# Patient Record
Sex: MALE | Race: WHITE | NOT HISPANIC OR LATINO | Employment: OTHER | URBAN - NONMETROPOLITAN AREA
[De-identification: names, ages, dates, MRNs, and addresses within clinical notes are randomized per-mention and may not be internally consistent; named-entity substitution may affect disease eponyms.]

---

## 2017-02-27 ENCOUNTER — HOSPITAL ENCOUNTER (EMERGENCY)
Facility: HOSPITAL | Age: 47
Discharge: HOME | End: 2017-02-27

## 2017-02-27 VITALS
HEIGHT: 66 IN | TEMPERATURE: 98.3 F | BODY MASS INDEX: 26.52 KG/M2 | WEIGHT: 165 LBS | SYSTOLIC BLOOD PRESSURE: 126 MMHG | HEART RATE: 83 BPM | RESPIRATION RATE: 17 BRPM | DIASTOLIC BLOOD PRESSURE: 93 MMHG | OXYGEN SATURATION: 99 %

## 2017-02-27 DIAGNOSIS — L23.7 ALLERGIC CONTACT DERMATITIS DUE TO PLANT: Primary | ICD-10-CM

## 2017-02-27 PROCEDURE — 96372 THER/PROPH/DIAG INJ SC/IM: CPT

## 2017-02-27 PROCEDURE — 99283 EMERGENCY DEPT VISIT LOW MDM: CPT

## 2017-02-27 RX ORDER — PREDNISONE 10 MG/1
TABLET ORAL
Qty: 45 TABLET | Refills: 0 | OUTPATIENT
Start: 2017-02-27 | End: 2022-02-26

## 2017-02-27 RX ORDER — TETRACAINE HYDROCHLORIDE 5 MG/ML
1 SOLUTION OPHTHALMIC ONCE
Status: COMPLETED | OUTPATIENT
Start: 2017-02-27 | End: 2017-02-27

## 2017-02-27 RX ORDER — HYDROXYZINE HYDROCHLORIDE 25 MG/1
25 TABLET, FILM COATED ORAL 3 TIMES DAILY PRN
Qty: 15 TABLET | Refills: 0 | Status: SHIPPED | OUTPATIENT
Start: 2017-02-27 | End: 2022-07-12 | Stop reason: ALTCHOICE

## 2017-02-27 RX ORDER — METHYLPREDNISOLONE ACETATE 80 MG/ML
80 INJECTION, SUSPENSION INTRA-ARTICULAR; INTRALESIONAL; INTRAMUSCULAR; SOFT TISSUE ONCE
Status: COMPLETED | OUTPATIENT
Start: 2017-02-27 | End: 2017-02-27

## 2017-02-27 RX ADMIN — METHYLPREDNISOLONE ACETATE 80 MG: 80 INJECTION, SUSPENSION INTRA-ARTICULAR; INTRALESIONAL; INTRAMUSCULAR; SOFT TISSUE at 11:20

## 2017-02-27 RX ADMIN — TETRACAINE HYDROCHLORIDE 1 DROP: 5 SOLUTION OPHTHALMIC at 11:09

## 2017-02-27 RX ADMIN — FLUORESCEIN SODIUM 1 STRIP: 1 STRIP OPHTHALMIC at 11:09

## 2018-03-29 ENCOUNTER — HOSPITAL ENCOUNTER (EMERGENCY)
Facility: HOSPITAL | Age: 48
Discharge: HOME | End: 2018-03-29
Attending: EMERGENCY MEDICINE

## 2018-03-29 VITALS
DIASTOLIC BLOOD PRESSURE: 82 MMHG | HEIGHT: 66 IN | SYSTOLIC BLOOD PRESSURE: 135 MMHG | TEMPERATURE: 98.2 F | BODY MASS INDEX: 25.71 KG/M2 | HEART RATE: 110 BPM | OXYGEN SATURATION: 97 % | RESPIRATION RATE: 18 BRPM | WEIGHT: 160 LBS

## 2018-03-29 DIAGNOSIS — L23.7 POISON IVY: Primary | ICD-10-CM

## 2018-03-29 PROCEDURE — 99283 EMERGENCY DEPT VISIT LOW MDM: CPT

## 2018-03-29 RX ORDER — PREDNISONE 20 MG/1
60 TABLET ORAL ONCE
Status: COMPLETED | OUTPATIENT
Start: 2018-03-29 | End: 2018-03-29

## 2018-03-29 RX ORDER — PREDNISONE 10 MG/1
TABLET ORAL
Qty: 40 TABLET | Refills: 0 | OUTPATIENT
Start: 2018-03-29 | End: 2022-02-26

## 2018-03-29 RX ADMIN — PREDNISONE 60 MG: 20 TABLET ORAL at 22:23

## 2018-03-30 NOTE — DISCHARGE INSTRUCTIONS
Poison Ivy   WHAT YOU NEED TO KNOW:   Poison ivy is a plant that can cause an itchy, uncomfortable rash on your skin  Poison ivy grows as a shrub or vine in woods, fields, and areas of thick [place]  It has 3 bright green leaves on each stem that turn red in rachna  DISCHARGE INSTRUCTIONS:   Medicines:   · Antiseptic or drying creams or ointments: These medicines may be used to dry out the rash and decrease the itching  These products may be available without a doctor's order  · Steroids: This medicine helps decrease itching and inflammation  It can be given as a cream to apply to your skin or as a pill  · Antihistamines: This medicine may help decrease itching and help you sleep  It is available without a doctor's order  · Take your medicine as directed  Contact your healthcare provider if you think your medicine is not helping or if you have side effects  Tell him of her if you are allergic to any medicine  Keep a list of the medicines, vitamins, and herbs you take  Include the amounts, and when and why you take them  Bring the list or the pill bottles to follow-up visits  Carry your medicine list with you in case of an emergency  Follow up with your healthcare provider as directed:  Write down your questions so you remember to ask them during your visits  How your poison ivy rash spreads: You cannot spread poison ivy by touching your rash or the liquid from your blisters  Poison ivy is spread only if you scratch your skin while it still has oil on it  You may think your rash is spreading because new rashes appear over a number of days  This happens because areas covered by thin skin break out in a rash first  Your face or forearms may develop a rash before thicker areas, such as the palms of your hands  Self-care:   · Keep your rash clean and dry:  Wash it with soap and water  Gently pat it dry with a clean towel  · Try not to scratch or rub your rash:   This can cause your skin to become infected  · Use a compress on your rash:  Dip a clean washcloth in cool water  Wring it out and place it on your rash  Leave the washcloth on your skin for 15 minutes  Do this at least 3 times per day  · Take a cornstarch or oatmeal bath: If your rash is too large to cover with wet washcloths, take 3 or 4 cornstarch baths daily  Mix 1 pound of cornstarch with a little water to make a paste  Add the paste to a tub full of water and mix well  You may also use colloidal oatmeal in the bath water  Use lukewarm water  Avoid hot water because it may cause your itching to increase  Prevent a poison ivy rash in the future:   · Wear skin protection:  Wear long pants, a long-sleeved shirt, and gloves  Use a skin block lotion to protect your skin from poison ivy oil  You can find this at a drugstore without a prescription  · Wash clothing after possible exposure: If you think you have been near a poison ivy plant, wash the clothes you were wearing separately from other clothes  Rinse the washing machine well after you take the clothes out  Scrub boots and shoes with warm, soapy water  Dry clean items and clothing that you cannot wash in water  Poison ivy oil is sticky and can stay on surfaces for a long time  It can cause a new rash even years later  · Bathe your pet:  Use warm water and shampoo on your pet's fur  This will prevent the spread of oil to your skin, car, and home  Wear long sleeves, long pants, and gloves while washing pets or any items that may have oil on them  · Reduce exposure to poison ivy:  Do not touch plants that look like poison ivy  Keep your yard free of poison ivy  While protecting your skin, remove the plant and the roots  Place them in a plastic bag and seal the bag tightly  · Do not burn poison ivy plants: This can spread the oil through the air  If you breathe the oil into your lungs, you could have swelling and serious breathing problems   Oil that clings to the fire attila can land on your skin and cause a rash  Contact your healthcare provider if:   · You have pus, soft yellow scabs, or tenderness on the rash  · The itching gets worse or keeps you awake at night  · The rash covers more than 1/4 of your skin or spreads to your eyes, mouth, or genital area  · The rash is not better after 2 to 3 weeks  · You have tender, swollen glands on the sides of your neck  · You have swelling in your arms and legs  · You have questions or concerns about your condition or care  Return to the emergency department if:   · You have a fever  · You have redness, swelling, and tenderness around the rash  · You have trouble breathing  © 2017 [facility] Information is for End User's use only and may not be sold, redistributed or otherwise used for commercial purposes  All illustrations and images included in CareNotes® are the copyrighted property of A D A M [employer]  or Micheal Pablo  The above information is an  only  It is not intended as medical advice for individual conditions or treatments  Talk to your doctor, nurse or pharmacist before following any medical regimen to see if it is safe and effective for you  Over-the-counter Benadryl for itching  Calamine topically for rash

## 2018-03-30 NOTE — ED PROVIDER NOTES
History  Chief Complaint   Patient presents with    Facial Swelling     c/o facial swelling related to "yard work and I think I got something poisonous on me"  Pt has same rash observed to R lower forearm  Reports itching, denies pain  Patient is a 39-year-old male  He is very sensitive to poison ivy  He was out doing yard work  He has since developed a pruritic rash to his face and arms that is similar to prior exacerbations of poison ivy  No trouble breathing  He is concerned because if he does not treated, usually swells up real bad  No fever or chills            Prior to Admission Medications   Prescriptions Last Dose Informant Patient Reported? Taking?   hydrOXYzine HCL (ATARAX) 25 mg tablet   No No   Sig: Take 1 tablet by mouth 3 (three) times a day as needed for itching for up to 5 days   mupirocin (BACTROBAN) 2 % ointment   No No   Sig: Apply 1 application topically 3 (three) times a day for 5 days   predniSONE 10 mg tablet   No No   Si tablets po daily x 3 days; 4 tablets po daily  3 days; 3 tablets po daily x3 days; 2 tablets po daily x3 days; 1 tablet po daily x3 days  Facility-Administered Medications: None       History reviewed  No pertinent past medical history  Past Surgical History:   Procedure Laterality Date    ANKLE FRACTURE SURGERY      TONSILLECTOMY         History reviewed  No pertinent family history  I have reviewed and agree with the history as documented  Social History   Substance Use Topics    Smoking status: Current Every Day Smoker     Packs/day: 0 25     Years: 20 00     Types: Cigarettes    Smokeless tobacco: Never Used    Alcohol use 1 2 oz/week     2 Cans of beer per week      Comment: "once a week"  Review of Systems   Constitutional: Negative for chills and fever  HENT: Negative for drooling, facial swelling, trouble swallowing and voice change  Respiratory: Negative for shortness of breath, wheezing and stridor      Skin: Positive for rash  All other systems reviewed and are negative  Physical Exam  ED Triage Vitals [03/29/18 2138]   Temperature Pulse Respirations Blood Pressure SpO2   98 2 °F (36 8 °C) (!) 110 18 135/82 97 %      Temp Source Heart Rate Source Patient Position - Orthostatic VS BP Location FiO2 (%)   Temporal Monitor Sitting Right arm --      Pain Score       No Pain           Orthostatic Vital Signs  Vitals:    03/29/18 2138   BP: 135/82   Pulse: (!) 110   Patient Position - Orthostatic VS: Sitting       Physical Exam   Constitutional: He is oriented to person, place, and time  He appears well-developed and well-nourished  HENT:   Head: Normocephalic and atraumatic  Eyes: Conjunctivae are normal  Right eye exhibits no discharge  Left eye exhibits no discharge  Neck: Normal range of motion  Neck supple  Pulmonary/Chest: Effort normal  No respiratory distress  Musculoskeletal: Normal range of motion  He exhibits no deformity  Neurological: He is alert and oriented to person, place, and time  Skin: Skin is warm and dry  Rash noted  There is a patchy crusty slightly raised rash the face  There are papules in linear lesions to the forearms and hands  Psychiatric: He has a normal mood and affect  His behavior is normal    Vitals reviewed        ED Medications  Medications   predniSONE tablet 60 mg (60 mg Oral Given 3/29/18 2223)       Diagnostic Studies  Results Reviewed     None                 No orders to display              Procedures  Procedures       Phone Contacts  ED Phone Contact    ED Course  ED Course                                MDM  CritCare Time    Disposition  Final diagnoses:   Poison ivy     Time reflects when diagnosis was documented in both MDM as applicable and the Disposition within this note     Time User Action Codes Description Comment    3/29/2018 10:23 PM Can Lino Add [L23 7] Poison ivy       ED Disposition     ED Disposition Condition Comment    Discharge  Angelina Tracy discharge to home/self care  Condition at discharge: Good        Follow-up Information     Follow up With Specialties Details Why Contact Info    Andrei Mckeon   As needed [address]  [place] [address]  [phone]          Patient's Medications   Discharge Prescriptions    PREDNISONE 10 MG TABLET    60mg po qd x 3 days, then 40mg po qd x 3 days, then 20mg po qd x 3 days, then 10mg po qd x 3 days, then stop       Start Date: 3/29/2018 End Date: --       Order Dose: --       Quantity: 40 tablet    Refills: 0     No discharge procedures on file      ED Provider  Electronically Signed by           Michael Josue MD  03/29/18 0750

## 2018-06-08 ENCOUNTER — HOSPITAL ENCOUNTER (EMERGENCY)
Facility: HOSPITAL | Age: 48
Discharge: HOME | End: 2018-06-08
Attending: EMERGENCY MEDICINE

## 2018-06-08 VITALS
HEART RATE: 112 BPM | RESPIRATION RATE: 18 BRPM | TEMPERATURE: 98.7 F | BODY MASS INDEX: 24.73 KG/M2 | OXYGEN SATURATION: 97 % | SYSTOLIC BLOOD PRESSURE: 119 MMHG | WEIGHT: 153.22 LBS | DIASTOLIC BLOOD PRESSURE: 74 MMHG

## 2018-06-08 DIAGNOSIS — L23.7 POISON IVY DERMATITIS: Primary | ICD-10-CM

## 2018-06-08 PROCEDURE — 99282 EMERGENCY DEPT VISIT SF MDM: CPT

## 2018-06-08 RX ORDER — PREDNISONE 10 MG/1
TABLET ORAL
Qty: 45 TABLET | Refills: 0 | OUTPATIENT
Start: 2018-06-08 | End: 2022-02-26

## 2018-06-08 RX ADMIN — PREDNISONE 50 MG: 10 TABLET ORAL at 01:32

## 2018-06-08 NOTE — ED PROVIDER NOTES
History  Chief Complaint   Patient presents with    Poison Ivy     Pt c/o poison ivy on left arm, left leg and right leg since last night - worse tonight     This is a pleasant 30-year-old male with the noted past medical history presents with a 1 day history of contact dermatitis from poison ivy of the left ventral forearm and left anterior leg  States he had exposure while weed whacking within the past 2 days and he has a significant hypersensitivity to poison ivy that has gotten worse over the past several years  He has been applying calamine lotion without improvement in symptoms  He had a similar severe eruption in 2018 for which he was seen in this emergency department and prescribed prednisone which he states resolved his sx  He has significant pruritus and scant amounts of crusting/discharge in the affected areas  Typical contact dermatitis with exposure history consistent with same  Patient states he has had good symptomatic relief with use of steroid taper previously and I will prescribe him same now  Advised PMD follow-up at completion of steroid taper for re-evaluation of symptoms  Return for any signs/symptoms of supervening infection  All questions answered prior to discharge  The patient expressed understanding and agreed to plan  History provided by:  Patient  Poison Elvira   Associated symptoms: no fatigue, no fever and no rash        Prior to Admission Medications   Prescriptions Last Dose Informant Patient Reported? Taking?   hydrOXYzine HCL (ATARAX) 25 mg tablet   No No   Sig: Take 1 tablet by mouth 3 (three) times a day as needed for itching for up to 5 days   mupirocin (BACTROBAN) 2 % ointment   No No   Sig: Apply 1 application topically 3 (three) times a day for 5 days   predniSONE 10 mg tablet   No No   Si tablets po daily x 3 days; 4 tablets po daily  3 days; 3 tablets po daily x3 days; 2 tablets po daily x3 days; 1 tablet po daily x3 days     predniSONE 10 mg tablet No No   Simg po qd x 3 days, then 40mg po qd x 3 days, then 20mg po qd x 3 days, then 10mg po qd x 3 days, then stop      Facility-Administered Medications: None       History reviewed  No pertinent past medical history  Past Surgical History:   Procedure Laterality Date    ANKLE FRACTURE SURGERY      TONSILLECTOMY         History reviewed  No pertinent family history  I have reviewed and agree with the history as documented  Social History   Substance Use Topics    Smoking status: Current Every Day Smoker     Packs/day: 0 25     Years: 20 00     Types: Cigarettes    Smokeless tobacco: Former User    Alcohol use 1 2 oz/week     2 Cans of beer per week      Comment: "once a week"  Review of Systems   Constitutional: Negative for chills, diaphoresis, fatigue and fever  Skin: Positive for color change and wound  Negative for pallor and rash  Neurological: Negative for weakness and numbness  Hematological: Negative for adenopathy  Does not bruise/bleed easily  Physical Exam  Physical Exam   Constitutional: He is oriented to person, place, and time  Vital signs are normal  He appears well-developed and well-nourished  He is active and cooperative  No distress  HENT:   Head: Normocephalic and atraumatic  Cardiovascular: Regular rhythm, intact distal pulses and normal pulses  Tachycardia present  Pulses:       Radial pulses are 2+ on the right side, and 2+ on the left side  Pulmonary/Chest: Effort normal  No respiratory distress  Neurological: He is alert and oriented to person, place, and time  GCS eye subscore is 4  GCS verbal subscore is 5  GCS motor subscore is 6  Skin: Skin is warm and dry  Capillary refill takes less than 2 seconds  Rash (Confluent erythematous crusted vesicular eruption with scant amount of serous yellow discharge from ventral L forearm and anterior L leg  This is consistent with contact dermatitis ) noted  He is not diaphoretic          Nursing note and vitals reviewed  Vital Signs  ED Triage Vitals [06/08/18 0028]   Temperature Pulse Respirations Blood Pressure SpO2   98 7 °F (37 1 °C) (!) 112 18 119/74 97 %      Temp Source Heart Rate Source Patient Position - Orthostatic VS BP Location FiO2 (%)   Temporal Monitor Sitting Left arm --      Pain Score       No Pain           Vitals:    06/08/18 0028   BP: 119/74   Pulse: (!) 112   Patient Position - Orthostatic VS: Sitting       Visual Acuity      ED Medications  Medications   predniSONE tablet 50 mg (not administered)       Diagnostic Studies  Results Reviewed     None                 No orders to display              Procedures  Procedures       Phone Contacts  ED Phone Contact    ED Course         MDM  Number of Diagnoses or Management Options  Poison ivy dermatitis: new and does not require workup     Amount and/or Complexity of Data Reviewed  Decide to obtain previous medical records or to obtain history from someone other than the patient: yes  Review and summarize past medical records: yes    Risk of Complications, Morbidity, and/or Mortality  Presenting problems: low  Diagnostic procedures: minimal  Management options: low    Patient Progress  Patient progress: stable    CritCare Time    Disposition  Final diagnoses:   Poison ivy dermatitis     Time reflects when diagnosis was documented in both MDM as applicable and the Disposition within this note     Time User Action Codes Description Comment    6/8/2018  1:23 AM Krysta Montes De Oca Add [L23 7] Poison ivy dermatitis       ED Disposition     ED Disposition Condition Comment    Discharge  Annella Lianet discharge to home/self care  Condition at discharge: Good        Follow-up Information     Follow up With Specialties Details Why [facility] Schedule an appointment as soon as possible for a visit in 10 days As needed for further evaluation [address]  [phone] Patient's Medications   Discharge Prescriptions    PREDNISONE 10 MG TABLET    5 tablets po daily x 3 days; 4 tablets po daily 3 days; 3 tablets po daily x3 days; 2 tablets po daily x3 days; 1 tablet po daily x3 days  Start Date: 6/8/2018  End Date: --       Order Dose: --       Quantity: 45 tablet    Refills: 0     No discharge procedures on file      ED Provider  Electronically Signed by           Gracy Everett DO  06/08/18 7140

## 2018-06-08 NOTE — DISCHARGE INSTRUCTIONS
Contact Dermatitis   WHAT YOU NEED TO KNOW:   Contact dermatitis is a skin rash  It develops when you touch something that irritates your skin or causes an allergic reaction  DISCHARGE INSTRUCTIONS:   Call 911 for any of the following:   · You have sudden trouble breathing  · Your throat swells and you have trouble eating  · Your face is swollen  Contact your healthcare provider if:   · You have a fever  · Your blisters are draining pus  · Your rash spreads or does not get better, even after treatment  · You have questions or concerns about your condition or care  Medicines:   · Medicines  help decrease itching and swelling  They will be given as a topical medicine to apply to your rash or as a pill  · Take your medicine as directed  Contact your healthcare provider if you think your medicine is not helping or if you have side effects  Tell him or her if you are allergic to any medicine  Keep a list of the medicines, vitamins, and herbs you take  Include the amounts, and when and why you take them  Bring the list or the pill bottles to follow-up visits  Carry your medicine list with you in case of an emergency  Manage contact dermatitis:   · Take short baths or showers in cool water  Use mild soap or soap-free cleansers  Add oatmeal, baking soda, or cornstarch to the bath water to help decrease skin irritation  · Avoid skin irritants , such as makeup, hair products, soaps, and cleansers  Use products that do not contain perfume or dye  · Apply a cool compress to your rash  This will help soothe your skin  · Keep your skin moist   Rub unscented cream or lotion on your skin to prevent dryness and itching  Do this right after a bath or shower when your skin is still damp  Follow up with your healthcare provider or dermatologist in 2 to 3 days:  Write down your questions so you remember to ask them during your visits     © 2017 [employer] Information is for End User's use only and may not be sold, redistributed or otherwise used for commercial purposes  All illustrations and images included in CareNotes® are the copyrighted property of A D A M , Inc  or Micheal Pablo  The above information is an  only  It is not intended as medical advice for individual conditions or treatments  Talk to your doctor, nurse or pharmacist before following any medical regimen to see if it is safe and effective for you  Poison Ivy   WHAT YOU NEED TO KNOW:   Poison ivy is a plant that can cause an itchy, uncomfortable rash on your skin  Poison ivy grows as a shrub or vine in woods, fields, and areas of thick [place]  It has 3 bright green leaves on each stem that turn red in rachna  DISCHARGE INSTRUCTIONS:   Medicines:   · Antiseptic or drying creams or ointments: These medicines may be used to dry out the rash and decrease the itching  These products may be available without a doctor's order  · Steroids: This medicine helps decrease itching and inflammation  It can be given as a cream to apply to your skin or as a pill  · Antihistamines: This medicine may help decrease itching and help you sleep  It is available without a doctor's order  · Take your medicine as directed  Contact your healthcare provider if you think your medicine is not helping or if you have side effects  Tell him of her if you are allergic to any medicine  Keep a list of the medicines, vitamins, and herbs you take  Include the amounts, and when and why you take them  Bring the list or the pill bottles to follow-up visits  Carry your medicine list with you in case of an emergency  Follow up with your healthcare provider as directed:  Write down your questions so you remember to ask them during your visits  How your poison ivy rash spreads: You cannot spread poison ivy by touching your rash or the liquid from your blisters   Poison ivy is spread only if you scratch your skin while it still has oil on it  You may think your rash is spreading because new rashes appear over a number of days  This happens because areas covered by thin skin break out in a rash first  Your face or forearms may develop a rash before thicker areas, such as the palms of your hands  Self-care:   · Keep your rash clean and dry:  Wash it with soap and water  Gently pat it dry with a clean towel  · Try not to scratch or rub your rash: This can cause your skin to become infected  · Use a compress on your rash:  Dip a clean washcloth in cool water  Wring it out and place it on your rash  Leave the washcloth on your skin for 15 minutes  Do this at least 3 times per day  · Take a cornstarch or oatmeal bath: If your rash is too large to cover with wet washcloths, take 3 or 4 cornstarch baths daily  Mix 1 pound of cornstarch with a little water to make a paste  Add the paste to a tub full of water and mix well  You may also use colloidal oatmeal in the bath water  Use lukewarm water  Avoid hot water because it may cause your itching to increase  Prevent a poison ivy rash in the future:   · Wear skin protection:  Wear long pants, a long-sleeved shirt, and gloves  Use a skin block lotion to protect your skin from poison ivy oil  You can find this at a drugstore without a prescription  · Wash clothing after possible exposure: If you think you have been near a poison ivy plant, wash the clothes you were wearing separately from other clothes  Rinse the washing machine well after you take the clothes out  Scrub boots and shoes with warm, soapy water  Dry clean items and clothing that you cannot wash in water  Poison ivy oil is sticky and can stay on surfaces for a long time  It can cause a new rash even years later  · Bathe your pet:  Use warm water and shampoo on your pet's fur  This will prevent the spread of oil to your skin, car, and home   Wear long sleeves, long pants, and gloves while washing pets or any items that may have oil on them  · Reduce exposure to poison ivy:  Do not touch plants that look like poison ivy  Keep your yard free of poison ivy  While protecting your skin, remove the plant and the roots  Place them in a plastic bag and seal the bag tightly  · Do not burn poison ivy plants: This can spread the oil through the air  If you breathe the oil into your lungs, you could have swelling and serious breathing problems  Oil that clings to the fire attila can land on your skin and cause a rash  Contact your healthcare provider if:   · You have pus, soft yellow scabs, or tenderness on the rash  · The itching gets worse or keeps you awake at night  · The rash covers more than 1/4 of your skin or spreads to your eyes, mouth, or genital area  · The rash is not better after 2 to 3 weeks  · You have tender, swollen glands on the sides of your neck  · You have swelling in your arms and legs  · You have questions or concerns about your condition or care  Return to the emergency department if:   · You have a fever  · You have redness, swelling, and tenderness around the rash  · You have trouble breathing  © 2017 [address] [employer]  Information is for End User's use only and may not be sold, redistributed or otherwise used for commercial purposes  All illustrations and images included in CareNotes® are the copyrighted property of A D A M , Inc  or Micheal Pablo  The above information is an  only  It is not intended as medical advice for individual conditions or treatments  Talk to your doctor, nurse or pharmacist before following any medical regimen to see if it is safe and effective for you

## 2018-06-08 NOTE — ED NOTES
Pt refusing to change into gown - states "I'll pull my pants leg up for the doctor to see"     Gena Blanton RN  06/08/18 5021

## 2018-06-28 ENCOUNTER — HOSPITAL ENCOUNTER (EMERGENCY)
Facility: HOSPITAL | Age: 48
Discharge: HOME | End: 2018-06-28
Attending: EMERGENCY MEDICINE | Admitting: EMERGENCY MEDICINE

## 2018-06-28 VITALS
TEMPERATURE: 98.2 F | DIASTOLIC BLOOD PRESSURE: 79 MMHG | BODY MASS INDEX: 24.62 KG/M2 | WEIGHT: 153.22 LBS | SYSTOLIC BLOOD PRESSURE: 142 MMHG | HEART RATE: 110 BPM | OXYGEN SATURATION: 100 % | HEIGHT: 66 IN | RESPIRATION RATE: 18 BRPM

## 2018-06-28 DIAGNOSIS — L23.7 POISON IVY: Primary | ICD-10-CM

## 2018-06-28 PROCEDURE — 99282 EMERGENCY DEPT VISIT SF MDM: CPT

## 2018-06-28 RX ORDER — PREDNISONE 20 MG/1
60 TABLET ORAL ONCE
Status: COMPLETED | OUTPATIENT
Start: 2018-06-28 | End: 2018-06-28

## 2018-06-28 RX ORDER — PREDNISONE 10 MG/1
TABLET ORAL
Qty: 40 TABLET | Refills: 0 | OUTPATIENT
Start: 2018-06-28 | End: 2022-02-26

## 2018-06-28 RX ADMIN — PREDNISONE 60 MG: 20 TABLET ORAL at 04:02

## 2018-06-28 NOTE — ED PROVIDER NOTES
History  Chief Complaint   Patient presents with   [facility]     Patient state he was mowing his lawn and now has poison all over his arms  Patient is a 58-year-old male  Patient frequently gets poison ivy  Recently he was mowing lawn and was exposed to poison ivy  Presents today with rash typical of his poison ivy  Rash is present to both forearms  Patient recently finished a course of steroids for a previous exacerbation of poison ivy  The rash is itchy  He has been treating it with calamine  No associated fever or chills  Prior to Admission Medications   Prescriptions Last Dose Informant Patient Reported? Taking?   hydrOXYzine HCL (ATARAX) 25 mg tablet   No No   Sig: Take 1 tablet by mouth 3 (three) times a day as needed for itching for up to 5 days   mupirocin (BACTROBAN) 2 % ointment   No No   Sig: Apply 1 application topically 3 (three) times a day for 5 days   predniSONE 10 mg tablet   No No   Si tablets po daily x 3 days; 4 tablets po daily  3 days; 3 tablets po daily x3 days; 2 tablets po daily x3 days; 1 tablet po daily x3 days  predniSONE 10 mg tablet   No No   Simg po qd x 3 days, then 40mg po qd x 3 days, then 20mg po qd x 3 days, then 10mg po qd x 3 days, then stop   predniSONE 10 mg tablet   No No   Si tablets po daily x 3 days; 4 tablets po daily 3 days; 3 tablets po daily x3 days; 2 tablets po daily x3 days; 1 tablet po daily x3 days  Facility-Administered Medications: None       History reviewed  No pertinent past medical history  Past Surgical History:   Procedure Laterality Date    ANKLE FRACTURE SURGERY      TONSILLECTOMY         History reviewed  No pertinent family history  I have reviewed and agree with the history as documented      Social History   Substance Use Topics    Smoking status: Current Every Day Smoker     Packs/day: 0 25     Years: 20 00     Types: Cigarettes    Smokeless tobacco: Former User    Alcohol use 1 2 oz/week 2 Cans of beer per week      Comment: "once a week"  Review of Systems   Constitutional: Negative for chills and fever  Skin: Positive for rash  Negative for wound  All other systems reviewed and are negative  Physical Exam  Physical Exam   Constitutional: He is oriented to person, place, and time  He appears well-developed and well-nourished  No distress  HENT:   Head: Normocephalic and atraumatic  Eyes: Conjunctivae are normal  Right eye exhibits no discharge  Left eye exhibits no discharge  Neck: Normal range of motion  Neck supple  Pulmonary/Chest: Effort normal  No respiratory distress  Musculoskeletal: Normal range of motion  He exhibits no deformity  Neurological: He is alert and oriented to person, place, and time  Skin: Skin is warm and dry  Rash noted  Rashes present to both forearms  It is across the red papular rash  There are is rash in linear distribution  Psychiatric: He has a normal mood and affect  His behavior is normal    Vitals reviewed        Vital Signs  ED Triage Vitals [06/28/18 0348]   Temperature Pulse Respirations Blood Pressure SpO2   98 2 °F (36 8 °C) (!) 110 18 142/79 100 %      Temp Source Heart Rate Source Patient Position - Orthostatic VS BP Location FiO2 (%)   Temporal Monitor Lying Right arm --      Pain Score       No Pain           Vitals:    06/28/18 0348   BP: 142/79   Pulse: (!) 110   Patient Position - Orthostatic VS: Lying       Visual Acuity      ED Medications  Medications   predniSONE tablet 60 mg (not administered)       Diagnostic Studies  Results Reviewed     None                 No orders to display              Procedures  Procedures       Phone Contacts  ED Phone Contact    ED Course                               MDM  CritCare Time    Disposition  Final diagnoses:   Poison ivy     Time reflects when diagnosis was documented in both MDM as applicable and the Disposition within this note     Time User Action Codes Description Comment 6/28/2018  3:56 AM Dolores Horton Add [L23 7] Poison ivy       ED Disposition     ED Disposition Condition Comment    Discharge  [place] discharge to home/self care  Condition at discharge: Good        Follow-up Information     Follow up With Specialties Details Why [address]  As needed [address]  [facility] [address]  [phone]            Patient's Medications   Discharge Prescriptions    PREDNISONE 10 MG TABLET    60mg po qd x 3 days, then 40mg po qd x 3 days, then 20mg po qd x 3 days, then 10mg po qd x 3 days, then stop       Start Date: 6/28/2018 End Date: --       Order Dose: --       Quantity: 40 tablet    Refills: 0     No discharge procedures on file      ED Provider  Electronically Signed by           Jakub Peralta MD  06/28/18 8202

## 2018-06-28 NOTE — DISCHARGE INSTRUCTIONS
Poison Ivy   WHAT YOU NEED TO KNOW:   Poison ivy is a plant that can cause an itchy, uncomfortable rash on your skin  Poison ivy grows as a shrub or vine in woods, fields, and areas of thick [place]  It has 3 bright green leaves on each stem that turn red in rachna  DISCHARGE INSTRUCTIONS:   Medicines:   · Antiseptic or drying creams or ointments: These medicines may be used to dry out the rash and decrease the itching  These products may be available without a doctor's order  · Steroids: This medicine helps decrease itching and inflammation  It can be given as a cream to apply to your skin or as a pill  · Antihistamines: This medicine may help decrease itching and help you sleep  It is available without a doctor's order  · Take your medicine as directed  Contact your healthcare provider if you think your medicine is not helping or if you have side effects  Tell him of her if you are allergic to any medicine  Keep a list of the medicines, vitamins, and herbs you take  Include the amounts, and when and why you take them  Bring the list or the pill bottles to follow-up visits  Carry your medicine list with you in case of an emergency  Follow up with your healthcare provider as directed:  Write down your questions so you remember to ask them during your visits  How your poison ivy rash spreads: You cannot spread poison ivy by touching your rash or the liquid from your blisters  Poison ivy is spread only if you scratch your skin while it still has oil on it  You may think your rash is spreading because new rashes appear over a number of days  This happens because areas covered by thin skin break out in a rash first  Your face or forearms may develop a rash before thicker areas, such as the palms of your hands  Self-care:   · Keep your rash clean and dry:  Wash it with soap and water  Gently pat it dry with a clean towel  · Try not to scratch or rub your rash:   This can cause your skin to become infected  · Use a compress on your rash:  Dip a clean washcloth in cool water  Wring it out and place it on your rash  Leave the washcloth on your skin for 15 minutes  Do this at least 3 times per day  · Take a cornstarch or oatmeal bath: If your rash is too large to cover with wet washcloths, take 3 or 4 cornstarch baths daily  Mix 1 pound of cornstarch with a little water to make a paste  Add the paste to a tub full of water and mix well  You may also use colloidal oatmeal in the bath water  Use lukewarm water  Avoid hot water because it may cause your itching to increase  Prevent a poison ivy rash in the future:   · Wear skin protection:  Wear long pants, a long-sleeved shirt, and gloves  Use a skin block lotion to protect your skin from poison ivy oil  You can find this at a drugstore without a prescription  · Wash clothing after possible exposure: If you think you have been near a poison ivy plant, wash the clothes you were wearing separately from other clothes  Rinse the washing machine well after you take the clothes out  Scrub boots and shoes with warm, soapy water  Dry clean items and clothing that you cannot wash in water  Poison ivy oil is sticky and can stay on surfaces for a long time  It can cause a new rash even years later  · Bathe your pet:  Use warm water and shampoo on your pet's fur  This will prevent the spread of oil to your skin, car, and home  Wear long sleeves, long pants, and gloves while washing pets or any items that may have oil on them  · Reduce exposure to poison ivy:  Do not touch plants that look like poison ivy  Keep your yard free of poison ivy  While protecting your skin, remove the plant and the roots  Place them in a plastic bag and seal the bag tightly  · Do not burn poison ivy plants: This can spread the oil through the air  If you breathe the oil into your lungs, you could have swelling and serious breathing problems   Oil that clings to the fire attila can land on your skin and cause a rash  Contact your healthcare provider if:   · You have pus, soft yellow scabs, or tenderness on the rash  · The itching gets worse or keeps you awake at night  · The rash covers more than 1/4 of your skin or spreads to your eyes, mouth, or genital area  · The rash is not better after 2 to 3 weeks  · You have tender, swollen glands on the sides of your neck  · You have swelling in your arms and legs  · You have questions or concerns about your condition or care  Return to the emergency department if:   · You have a fever  · You have redness, swelling, and tenderness around the rash  · You have trouble breathing  © 2017 [facility] [facility] Information is for End User's use only and may not be sold, redistributed or otherwise used for commercial purposes  All illustrations and images included in CareNotes® are the copyrighted property of A D [employer] M [employer]  or [employer]heal Pablo  The above information is an  only  It is not intended as medical advice for individual conditions or treatments  Talk to your doctor, nurse or pharmacist before following any medical regimen to see if it is safe and effective for you

## 2018-07-17 ENCOUNTER — HOSPITAL ENCOUNTER (EMERGENCY)
Facility: HOSPITAL | Age: 48
Discharge: HOME | End: 2018-07-18
Attending: EMERGENCY MEDICINE

## 2018-07-17 VITALS
OXYGEN SATURATION: 97 % | HEART RATE: 99 BPM | WEIGHT: 156 LBS | DIASTOLIC BLOOD PRESSURE: 92 MMHG | BODY MASS INDEX: 25.07 KG/M2 | RESPIRATION RATE: 18 BRPM | HEIGHT: 66 IN | TEMPERATURE: 98.1 F | SYSTOLIC BLOOD PRESSURE: 131 MMHG

## 2018-07-17 DIAGNOSIS — L25.5 DERMATITIS DUE TO PLANTS, INCLUDING POISON IVY, SUMAC, AND OAK: Primary | ICD-10-CM

## 2018-07-17 RX ORDER — CETIRIZINE HYDROCHLORIDE 10 MG/1
10 TABLET ORAL DAILY
Qty: 14 TABLET | Refills: 0 | Status: SHIPPED | OUTPATIENT
Start: 2018-07-17 | End: 2022-07-12 | Stop reason: ALTCHOICE

## 2018-07-17 RX ORDER — PREDNISONE 20 MG/1
60 TABLET ORAL ONCE
Status: COMPLETED | OUTPATIENT
Start: 2018-07-18 | End: 2018-07-18

## 2018-07-17 RX ORDER — PREDNISONE 10 MG/1
TABLET ORAL
Qty: 60 TABLET | Refills: 0 | OUTPATIENT
Start: 2018-07-17 | End: 2022-02-26

## 2018-07-17 RX ORDER — LORATADINE 10 MG/1
10 TABLET ORAL ONCE
Status: COMPLETED | OUTPATIENT
Start: 2018-07-18 | End: 2018-07-18

## 2018-07-18 PROCEDURE — 99282 EMERGENCY DEPT VISIT SF MDM: CPT

## 2018-07-18 RX ADMIN — PREDNISONE 60 MG: 20 TABLET ORAL at 00:04

## 2018-07-18 RX ADMIN — LORATADINE 10 MG: 10 TABLET ORAL at 00:04

## 2018-07-18 NOTE — DISCHARGE INSTRUCTIONS
Poison Ivy   WHAT YOU NEED TO KNOW:   Poison ivy is a plant that can cause an itchy, uncomfortable rash on your skin  Poison ivy grows as a shrub or vine in woods, fields, and areas of thick [place]  It has 3 bright green leaves on each stem that turn red in rachna  DISCHARGE INSTRUCTIONS:   Medicines:   · Antiseptic or drying creams or ointments: These medicines may be used to dry out the rash and decrease the itching  These products may be available without a doctor's order  · Steroids: This medicine helps decrease itching and inflammation  It can be given as a cream to apply to your skin or as a pill  · Antihistamines: This medicine may help decrease itching and help you sleep  It is available without a doctor's order  · Take your medicine as directed  Contact your healthcare provider if you think your medicine is not helping or if you have side effects  Tell him of her if you are allergic to any medicine  Keep a list of the medicines, vitamins, and herbs you take  Include the amounts, and when and why you take them  Bring the list or the pill bottles to follow-up visits  Carry your medicine list with you in case of an emergency  Follow up with your healthcare provider as directed:  Write down your questions so you remember to ask them during your visits  How your poison ivy rash spreads: You cannot spread poison ivy by touching your rash or the liquid from your blisters  Poison ivy is spread only if you scratch your skin while it still has oil on it  You may think your rash is spreading because new rashes appear over a number of days  This happens because areas covered by thin skin break out in a rash first  Your face or forearms may develop a rash before thicker areas, such as the palms of your hands  Self-care:   · Keep your rash clean and dry:  Wash it with soap and water  Gently pat it dry with a clean towel  · Try not to scratch or rub your rash:   This can cause your skin to become infected  · Use a compress on your rash:  Dip a clean washcloth in cool water  Wring it out and place it on your rash  Leave the washcloth on your skin for 15 minutes  Do this at least 3 times per day  · Take a cornstarch or oatmeal bath: If your rash is too large to cover with wet washcloths, take 3 or 4 cornstarch baths daily  Mix 1 pound of cornstarch with a little water to make a paste  Add the paste to a tub full of water and mix well  You may also use colloidal oatmeal in the bath water  Use lukewarm water  Avoid hot water because it may cause your itching to increase  Prevent a poison ivy rash in the future:   · Wear skin protection:  Wear long pants, a long-sleeved shirt, and gloves  Use a skin block lotion to protect your skin from poison ivy oil  You can find this at a drugstore without a prescription  · Wash clothing after possible exposure: If you think you have been near a poison ivy plant, wash the clothes you were wearing separately from other clothes  Rinse the washing machine well after you take the clothes out  Scrub boots and shoes with warm, soapy water  Dry clean items and clothing that you cannot wash in water  Poison ivy oil is sticky and can stay on surfaces for a long time  It can cause a new rash even years later  · Bathe your pet:  Use warm water and shampoo on your pet's fur  This will prevent the spread of oil to your skin, car, and home  Wear long sleeves, long pants, and gloves while washing pets or any items that may have oil on them  · Reduce exposure to poison ivy:  Do not touch plants that look like poison ivy  Keep your yard free of poison ivy  While protecting your skin, remove the plant and the roots  Place them in a plastic bag and seal the bag tightly  · Do not burn poison ivy plants: This can spread the oil through the air  If you breathe the oil into your lungs, you could have swelling and serious breathing problems   Oil that clings to the fire attila can land on your skin and cause a rash  Contact your healthcare provider if:   · You have pus, soft yellow scabs, or tenderness on the rash  · The itching gets worse or keeps you awake at night  · The rash covers more than 1/4 of your skin or spreads to your eyes, mouth, or genital area  · The rash is not better after 2 to 3 weeks  · You have tender, swollen glands on the sides of your neck  · You have swelling in your arms and legs  · You have questions or concerns about your condition or care  Return to the emergency department if:   · You have a fever  · You have redness, swelling, and tenderness around the rash  · You have trouble breathing  © 2017 [address] [facility] Information is for End User's use only and may not be sold, redistributed or otherwise used for commercial purposes  All illustrations and images included in CareNotes® are the copyrighted property of A [employer] M [employer]  or [employer]heal Pablo  The above information is an  only  It is not intended as medical advice for individual conditions or treatments  Talk to your doctor, nurse or pharmacist before following any medical regimen to see if it is safe and effective for you

## 2018-07-21 NOTE — ED PROVIDER NOTES
History  Chief Complaint   Patient presents with   [facility]     Patient states that hes had this before and the poison just keeps coming back  Patient: Dominick Gill  1 y o /male  YOB: 1970  MRN: [id number]  PCP: Ismael Mcclendon  Date of evaluation: 2018    (N B  [address] may have been used in the preparation of this document )    History provided by:  Patient  Poison Elvira   Location:  Chest and arms  Severity:  Severe  Onset quality:  Gradual  Timing: got better in the past with steroids but never completely resolved  Relieved by:  Nothing  Worsened by:  Nothing  Ineffective treatments:  Calamine  Associated symptoms: rash    Associated symptoms: no abdominal pain, no chest pain, no fever and no shortness of breath        Prior to Admission Medications   Prescriptions Last Dose Informant Patient Reported? Taking?   hydrOXYzine HCL (ATARAX) 25 mg tablet   No No   Sig: Take 1 tablet by mouth 3 (three) times a day as needed for itching for up to 5 days   mupirocin (BACTROBAN) 2 % ointment   No No   Sig: Apply 1 application topically 3 (three) times a day for 5 days   predniSONE 10 mg tablet   No No   Si tablets po daily x 3 days; 4 tablets po daily  3 days; 3 tablets po daily x3 days; 2 tablets po daily x3 days; 1 tablet po daily x3 days  predniSONE 10 mg tablet   No No   Simg po qd x 3 days, then 40mg po qd x 3 days, then 20mg po qd x 3 days, then 10mg po qd x 3 days, then stop   predniSONE 10 mg tablet   No No   Si tablets po daily x 3 days; 4 tablets po daily 3 days; 3 tablets po daily x3 days; 2 tablets po daily x3 days; 1 tablet po daily x3 days  predniSONE 10 mg tablet   No No   Simg po qd x 3 days, then 40mg po qd x 3 days, then 20mg po qd x 3 days, then 10mg po qd x 3 days, then stop      Facility-Administered Medications: None       History reviewed  No pertinent past medical history      Past Surgical History:   Procedure Laterality Date  ANKLE FRACTURE SURGERY      TONSILLECTOMY         History reviewed  No pertinent family history  I have reviewed and agree with the history as documented  Social History   Substance Use Topics    Smoking status: Current Every Day Smoker     Packs/day: 0 50     Years: 20 00     Types: Cigarettes    Smokeless tobacco: Former User    Alcohol use 1 2 oz/week     2 Cans of beer per week      Comment: "once a week"  Review of Systems   Constitutional: Negative for fever  Respiratory: Negative for shortness of breath  Cardiovascular: Negative for chest pain  Gastrointestinal: Negative for abdominal pain  Skin: Positive for rash  Negative for wound  Physical Exam  Physical Exam   Constitutional: He is oriented to person, place, and time  He appears well-developed and well-nourished  HENT:   Mouth/Throat: Oropharynx is clear and moist and mucous membranes are normal    Voice normal   Eyes: EOM are normal  Pupils are equal, round, and reactive to light  Cardiovascular: Normal rate and regular rhythm  Pulmonary/Chest: Effort normal    Abdominal: Soft  Bowel sounds are normal    Neurological: He is alert and oriented to person, place, and time  GCS eye subscore is 4  GCS verbal subscore is 5  GCS motor subscore is 6  Skin: Skin is warm and dry  Psychiatric: He has a normal mood and affect  His speech is normal and behavior is normal    Nursing note and vitals reviewed        Vital Signs  ED Triage Vitals [07/17/18 2309]   Temperature Pulse Respirations Blood Pressure SpO2   98 1 °F (36 7 °C) 99 18 131/92 97 %      Temp Source Heart Rate Source Patient Position - Orthostatic VS BP Location FiO2 (%)   Temporal Monitor -- Left arm --      Pain Score       No Pain           Vitals:    07/17/18 2309   BP: 131/92   Pulse: 99       Visual Acuity      ED Medications  Medications   predniSONE tablet 60 mg (60 mg Oral Given 7/18/18 0004)   loratadine (CLARITIN) tablet 10 mg (10 mg Oral Given 7/18/18 0004)       Diagnostic Studies  Results Reviewed     None                 No orders to display              Procedures  Procedures       Phone Contacts  ED Phone Contact    ED Course           Longer steroid taper ordered  I reviewed the results of this evaluation and their significance, as well as the need for followup, with the patient and with family when available  All concerns / questions were addressed  I went over any signs / symptoms which should prompt a return to the ED  The patient appears stable for discharge and early follow-up as directed  MDM  CritCare Time    Disposition  Final diagnoses:   Dermatitis due to plants, including poison ivy, sumac, and oak     Time reflects when diagnosis was documented in both MDM as applicable and the Disposition within this note     Time User Action Codes Description Comment    7/17/2018 11:55 PM Nilda SLATER Add [L25 5] Dermatitis due to plants, including poison ivy, sumac, and oak       ED Disposition     ED Disposition Condition Comment    Discharge  Perla Carrillo discharge to home/self care  Condition at discharge: Good        Follow-up Information     Follow up With Specialties Details Why [facility] Call in 1 day Say you are following up from an ER visit  [address]  [facility] [address]  [phone]            Discharge Medication List as of 7/17/2018 11:59 PM      START taking these medications    Details   cetirizine (ZyrTEC) 10 mg tablet Take 1 tablet (10 mg total) by mouth daily for 14 days, Starting Tue 7/17/2018, Until Tue 7/31/2018, Print      !! predniSONE 10 mg tablet Taper daily as follows: 5 5 5 5 4 4 4 4 3 3 3 3 2 2 2 2 1 1 1 1 stop  Disp QS , Print       !! - Potential duplicate medications found  Please discuss with provider        CONTINUE these medications which have NOT CHANGED    Details   hydrOXYzine HCL (ATARAX) 25 mg tablet Take 1 tablet by mouth 3 (three) times a day as needed for itching for up to 5 days, Starting 2/27/2017, Until Sat 3/4/17, Print      mupirocin (BACTROBAN) 2 % ointment Apply 1 application topically 3 (three) times a day for 5 days, Starting 2/27/2017, Until Sat 3/4/17, Print      !! predniSONE 10 mg tablet 5 tablets po daily x 3 days; 4 tablets po daily  3 days; 3 tablets po daily x3 days; 2 tablets po daily x3 days; 1 tablet po daily x3 days  , Print      !! predniSONE 10 mg tablet 60mg po qd x 3 days, then 40mg po qd x 3 days, then 20mg po qd x 3 days, then 10mg po qd x 3 days, then stop, Print      !! predniSONE 10 mg tablet 5 tablets po daily x 3 days; 4 tablets po daily 3 days; 3 tablets po daily x3 days; 2 tablets po daily x3 days; 1 tablet po daily x3 days  , Print      !! predniSONE 10 mg tablet 60mg po qd x 3 days, then 40mg po qd x 3 days, then 20mg po qd x 3 days, then 10mg po qd x 3 days, then stop, Print       !! - Potential duplicate medications found  Please discuss with provider  No discharge procedures on file      ED Provider  Electronically Signed by           Hubert Root MD  07/21/18 1841

## 2022-02-26 ENCOUNTER — HOSPITAL ENCOUNTER (EMERGENCY)
Facility: HOSPITAL | Age: 52
Discharge: HOME | End: 2022-02-26
Attending: EMERGENCY MEDICINE | Admitting: EMERGENCY MEDICINE

## 2022-02-26 ENCOUNTER — APPOINTMENT (EMERGENCY)
Dept: RADIOLOGY | Facility: HOSPITAL | Age: 52
End: 2022-02-26

## 2022-02-26 VITALS
HEIGHT: 66 IN | HEART RATE: 101 BPM | WEIGHT: 160 LBS | OXYGEN SATURATION: 98 % | BODY MASS INDEX: 25.71 KG/M2 | RESPIRATION RATE: 18 BRPM | TEMPERATURE: 98.6 F
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
## 2022-02-26 DIAGNOSIS — S61.012A LACERATION OF LEFT THUMB WITHOUT FOREIGN BODY WITHOUT DAMAGE TO NAIL, INITIAL ENCOUNTER: Primary | ICD-10-CM

## 2022-02-26 PROCEDURE — 99284 EMERGENCY DEPT VISIT MOD MDM: CPT

## 2022-02-26 PROCEDURE — 99284 EMERGENCY DEPT VISIT MOD MDM: CPT | Performed by: PHYSICIAN ASSISTANT

## 2022-02-26 PROCEDURE — 73130 X-RAY EXAM OF HAND: CPT

## 2022-02-26 RX ORDER — NAPROXEN 500 MG/1
500 TABLET ORAL 2 TIMES DAILY WITH MEALS
Qty: 30 TABLET | Refills: 0 | Status: SHIPPED | OUTPATIENT
Start: 2022-02-26 | End: 2022-07-12 | Stop reason: ALTCHOICE

## 2022-02-26 RX ORDER — CEPHALEXIN 500 MG/1
500 CAPSULE ORAL EVERY 8 HOURS SCHEDULED
Qty: 21 CAPSULE | Refills: 0 | Status: SHIPPED | OUTPATIENT
Start: 2022-02-26 | End: 2022-03-05

## 2022-02-26 RX ORDER — CEPHALEXIN 250 MG/1
500 CAPSULE ORAL ONCE
Status: COMPLETED | OUTPATIENT
Start: 2022-02-26 | End: 2022-02-26

## 2022-02-26 RX ORDER — IBUPROFEN 600 MG/1
600 TABLET ORAL ONCE
Status: COMPLETED | OUTPATIENT
Start: 2022-02-26 | End: 2022-02-26

## 2022-02-26 RX ORDER — HYDROCODONE BITARTRATE AND ACETAMINOPHEN 5; 325 MG/1; MG/1
1 TABLET ORAL ONCE
Status: COMPLETED | OUTPATIENT
Start: 2022-02-26 | End: 2022-02-26

## 2022-02-26 RX ORDER — HYDROCODONE BITARTRATE AND ACETAMINOPHEN 5; 325 MG/1; MG/1
1 TABLET ORAL EVERY 6 HOURS PRN
Qty: 8 TABLET | Refills: 0 | Status: SHIPPED | OUTPATIENT
Start: 2022-02-26 | End: 2022-07-12 | Stop reason: ALTCHOICE

## 2022-02-26 RX ORDER — LIDOCAINE HYDROCHLORIDE 10 MG/ML
10 INJECTION, SOLUTION EPIDURAL; INFILTRATION; INTRACAUDAL; PERINEURAL ONCE
Status: COMPLETED | OUTPATIENT
Start: 2022-02-26 | End: 2022-02-26

## 2022-02-26 RX ADMIN — LIDOCAINE HYDROCHLORIDE 10 ML: 10 INJECTION, SOLUTION EPIDURAL; INFILTRATION; INTRACAUDAL; PERINEURAL at 14:46

## 2022-02-26 RX ADMIN — IBUPROFEN 600 MG: 600 TABLET ORAL at 14:45

## 2022-02-26 RX ADMIN — HYDROCODONE BITARTRATE AND ACETAMINOPHEN 1 TABLET: 5; 325 TABLET ORAL at 14:45

## 2022-02-26 RX ADMIN — CEPHALEXIN 500 MG: 250 CAPSULE ORAL at 15:22

## 2022-02-26 NOTE — ED PROVIDER NOTES
History  Chief Complaint   Patient presents with    Trauma    Finger Laceration     pt reports cutting [place] for house when cut left thumb with saw     45year old right hand dominant male presents ambulatory from home for evaluation of left thumb laceration  This occurred today just prior to arrival   Pt notes he was cutting [place] the house when he caught his thumb on a circular saw  He reports laceration of the distal thumb  Applied pressure and presented for evaluation  C/o throbbing pain  No difficulty moving his digits  Denies numbness, tingling  Pain aggravated by touch  No reported alleviating factors  No other injuries reported  He has otherwise been in his usual state of health  He reports tetanus is UTD and received within the past 1-2 years  History provided by:  Patient   used: No    Finger Laceration  Location:  Finger  Finger laceration location:  L thumb  Laceration mechanism:  Metal edge  Pain details:     Quality:  Throbbing    Severity:  Severe  Worsened by: Movement  Ineffective treatments:  Pressure  Tetanus status:  Up to date  Associated symptoms: no fever, no focal weakness, no numbness, no rash, no redness, no swelling and no streaking        Prior to Admission Medications   Prescriptions Last Dose Informant Patient Reported? Taking? cetirizine (ZyrTEC) 10 mg tablet   No No   Sig: Take 1 tablet (10 mg total) by mouth daily for 14 days   hydrOXYzine HCL (ATARAX) 25 mg tablet   No No   Sig: Take 1 tablet by mouth 3 (three) times a day as needed for itching for up to 5 days   mupirocin (BACTROBAN) 2 % ointment   No No   Sig: Apply 1 application topically 3 (three) times a day for 5 days   predniSONE 10 mg tablet Not Taking at Unknown time  No No   Si tablets po daily x 3 days; 4 tablets po daily  3 days; 3 tablets po daily x3 days; 2 tablets po daily x3 days; 1 tablet po daily x3 days     Patient not taking: Reported on 2022    predniSONE 10 mg tablet Not Taking at Unknown time  No No   Simg po qd x 3 days, then 40mg po qd x 3 days, then 20mg po qd x 3 days, then 10mg po qd x 3 days, then stop   Patient not taking: Reported on 2022    predniSONE 10 mg tablet Not Taking at Unknown time  No No   Si tablets po daily x 3 days; 4 tablets po daily 3 days; 3 tablets po daily x3 days; 2 tablets po daily x3 days; 1 tablet po daily x3 days  Patient not taking: Reported on 2022    predniSONE 10 mg tablet Not Taking at Unknown time  No No   Simg po qd x 3 days, then 40mg po qd x 3 days, then 20mg po qd x 3 days, then 10mg po qd x 3 days, then stop   Patient not taking: Reported on 2022    predniSONE 10 mg tablet Not Taking at Unknown time  No No   Sig: Taper daily as follows: 5 5 5 5 4 4 4 4 3 3 3 3 2 2 2 2 1 1 1 1 stop  Disp QS  Patient not taking: Reported on 2022       Facility-Administered Medications: None       History reviewed  No pertinent past medical history  Past Surgical History:   Procedure Laterality Date    ANKLE FRACTURE SURGERY      TONSILLECTOMY         History reviewed  No pertinent family history  I have reviewed and agree with the history as documented  E-Cigarette/Vaping     E-Cigarette/Vaping Substances     Social History     Tobacco Use    Smoking status: Current Every Day Smoker     Packs/day: 0 50     Years: 20 00     Pack years: 10 00     Types: Cigarettes    Smokeless tobacco: Former User   Substance Use Topics    Alcohol use: Yes     Alcohol/week: 2 0 standard drinks     Types: 2 Cans of beer per week     Comment: "once a week"   Drug use: No       Review of Systems   Constitutional: Negative  Negative for chills, fatigue and fever  HENT: Negative  Negative for congestion, rhinorrhea and sore throat  Eyes: Negative  Respiratory: Negative  Negative for cough, shortness of breath and wheezing  Cardiovascular: Negative  Negative for chest pain     Gastrointestinal: Negative  Negative for abdominal pain, diarrhea, nausea and vomiting  Genitourinary: Negative  Musculoskeletal: Positive for arthralgias  Negative for back pain and neck pain  Skin: Positive for wound  Negative for rash  Neurological: Negative  Negative for dizziness, focal weakness, numbness and headaches  Psychiatric/Behavioral: Negative  All other systems reviewed and are negative  Physical Exam  Physical Exam  Vitals and nursing note reviewed  Constitutional:       General: He is in acute distress  Appearance: Normal appearance  He is well-developed  He is not toxic-appearing or diaphoretic  HENT:      Head: Normocephalic and atraumatic  Right Ear: Hearing and external ear normal       Left Ear: Hearing and external ear normal    Eyes:      General: Lids are normal  No scleral icterus  Conjunctiva/sclera: Conjunctivae normal       Pupils: Pupils are equal, round, and reactive to light  Neck:      Trachea: Trachea and phonation normal  No tracheal deviation  Cardiovascular:      Rate and Rhythm: Normal rate and regular rhythm  Pulses: Normal pulses  Radial pulses are 2+ on the right side and 2+ on the left side  Heart sounds: Normal heart sounds, S1 normal and S2 normal  No murmur heard  Pulmonary:      Effort: Pulmonary effort is normal  No tachypnea or respiratory distress  Musculoskeletal:      Left hand: Laceration and tenderness present  No swelling or deformity  Normal range of motion  Normal strength  Normal sensation  There is no disruption of two-point discrimination  Normal capillary refill  Normal pulse  Hands:       Comments: Numerous irregular shaped wounds/abrasions to the pad of the left thumb  This encompasses the thumb pad, distal to IP joint  Jagged in nature  No arterial bleeding  No deformity  No FB  Nail intact  He is able to perform ROM of the thumb  Localized tenderness to the pad     Skin:     General: Skin is warm and dry  Capillary Refill: Capillary refill takes less than 2 seconds  Findings: Abrasion and laceration present  No bruising or rash  Neurological:      General: No focal deficit present  Mental Status: He is alert and oriented to person, place, and time  GCS: GCS eye subscore is 4  GCS verbal subscore is 5  GCS motor subscore is 6  Cranial Nerves: No cranial nerve deficit  Sensory: No sensory deficit  Motor: No abnormal muscle tone  Gait: Gait normal       Comments: Pt ambulated into department unassisted  Psychiatric:         Mood and Affect: Mood normal          Speech: Speech normal          Behavior: Behavior normal          Vital Signs  ED Triage Vitals [02/26/22 1436]   Temperature Pulse Respirations BP SpO2   98 6 °F (37 °C) 101 18 -- 98 %      Temp Source Heart Rate Source Patient Position - Orthostatic VS BP Location FiO2 (%)   Tympanic Monitor Sitting Right arm --      Pain Score       10 - Worst Possible Pain           Vitals:    02/26/22 1436   Pulse: 101   Patient Position - Orthostatic VS: Sitting         Visual Acuity      ED Medications  Medications   lidocaine (PF) (XYLOCAINE-MPF) 1 % injection 10 mL (10 mL Infiltration Given 2/26/22 1446)   HYDROcodone-acetaminophen (NORCO) 5-325 mg per tablet 1 tablet (1 tablet Oral Given 2/26/22 1445)   ibuprofen (MOTRIN) tablet 600 mg (600 mg Oral Given 2/26/22 1445)   cephalexin (KEFLEX) capsule 500 mg (500 mg Oral Given 2/26/22 1522)       Diagnostic Studies  Results Reviewed     None                 XR hand 3+ views LEFT    (Results Pending)              Procedures  Procedures         ED Course  ED Course as of 02/26/22 1623   Sat Feb 26, 2022   1454 XR hand 3+ views LEFT  Independently viewed and interpreted by me - no fracture or dislocation, no FB, soft tissue laceration/defect distal thumb; pending official read     1500 We discussed that area not likely amendable to much repair, however will proceed with digital block for anesthesia and further exploration/repair  After discussion pt was initially agreeable, however did not cooperate for a digital block  Pt then refusing any intervention at this time  We attempted digital block and he pulled away and states "no needles"  He is refusing the digital block  He is refusing any sutures or anything that would involve a needle  I did explain that without anesthetizing the area, we are unable to attempt repair  He did allow me to thoroughly irrigate the area and he did also soak this in betadine  1505 I did discuss with attending who also evaluated area  Pt also voiced to him that he didn't want repair  1510 Even after extended discussion, pt does not want anything done that involves a needle  We did discuss that due to the nature of the wound, there isn't specific repair that could be done but with local anesthesia may need some debridement  The wound is very macerated  He requests a bandage and would like to be discharged  I applied a piece of surgifoam over the wound, then placed vaseline gauze over this and wrapped with a gauze wrap  Pt with laceration to the pad of left thumb secondary to a saw injury  Pt refused any specific repair that involved a needle  He did not cooperate for a digital block  Given pt refusing anything involving needles, not much of a repair could be completed  Will allow to heal by secondary intent  Risks/benefits/side effects/alternatives discussed  Xray does not show any bony injury  Tetanus is reported to be UTD  I do not have records of this but pt states it has been within the past 1-2 years  Sterile dressing applied and pt sent home with additional supplies for dressing changes  Reviewed standard wound care  S/sx infection reviewed  Pt started on Abx for infection prophylaxis  Medication given for pain relief  PDMP was queried and no suspicious behaviors noted    Sedation precautions reviewed  Strict return precautions outlined  Advised follow up within next 2-3 days for wound recheck  Advised outpatient follow up with PCP and hand surgery or return to ER for change in condition as outlined  Pt and significant other verbalized understanding and had no further questions  SBIRT 22yo+      Most Recent Value   SBIRT (24 yo +)    In order to provide better care to our patients, we are screening all of our patients for alcohol and drug use  Would it be okay to ask you these screening questions? No Filed at: 02/26/2022 1447                    MDM  Number of Diagnoses or Management Options  Contact with powered saw as cause of accidental injury: new and requires workup  Laceration of left thumb without foreign body without damage to nail, initial encounter: new and requires workup     Amount and/or Complexity of Data Reviewed  Tests in the radiology section of CPT®: ordered and reviewed  Decide to obtain previous medical records or to obtain history from someone other than the patient: yes  Obtain history from someone other than the patient: yes  Review and summarize past medical records: yes  Independent visualization of images, tracings, or specimens: yes    Patient Progress  Patient progress: improved      Disposition  Final diagnoses:   Laceration of left thumb without foreign body without damage to nail, initial encounter   Contact with powered saw as cause of accidental injury     Time reflects when diagnosis was documented in both MDM as applicable and the Disposition within this note     Time User Action Codes Description Comment    2/26/2022  3:12 PM Rosendo Ashford Add [S61 012A] Laceration of left thumb without foreign body without damage to nail, initial encounter     2/26/2022  3:13 PM Trudy Kelly, [address] [[id number]  [id number]] Contact with powered saw as cause of accidental injury       ED Disposition     ED Disposition Condition Date/Time Comment    Discharge Stable Sat Feb 26, 2022  3:12 PM Corby Everett discharge to home/self care              Follow-up Information     Follow up With Specialties Details Why Contact Info Additional Information    Akash Cottrell MD Orthopedic Surgery, Hand Surgery Schedule an appointment as soon as possible for a visit   Adrienne Diaz [address] [phone]       [facility] Emergency Department Emergency Medicine  As needed [phone]  [address] [facility] Emergency Department, [place] [address], [address] [place] [address], [address] [place] [address], [facility]          Discharge Medication List as of 2/26/2022  3:20 PM      START taking these medications    Details   cephalexin (KEFLEX) 500 mg capsule Take 1 capsule (500 mg total) by mouth every 8 (eight) hours for 7 days, Starting Sat 2/26/2022, Until Sat 3/5/2022, Normal      HYDROcodone-acetaminophen (Norco) 5-325 mg per tablet Take 1 tablet by mouth every 6 (six) hours as needed for pain Max Daily Amount: 4 tablets, Starting Sat 2/26/2022, Normal      naproxen (Naprosyn) 500 mg tablet Take 1 tablet (500 mg total) by mouth 2 (two) times a day with meals, Starting Sat 2/26/2022, Normal         CONTINUE these medications which have NOT CHANGED    Details   cetirizine (ZyrTEC) 10 mg tablet Take 1 tablet (10 mg total) by mouth daily for 14 days, Starting Tue 7/17/2018, Until Tue 7/31/2018, Print      hydrOXYzine HCL (ATARAX) 25 mg tablet Take 1 tablet by mouth 3 (three) times a day as needed for itching for up to 5 days, Starting 2/27/2017, Until Sat 3/4/17, Print      mupirocin (BACTROBAN) 2 % ointment Apply 1 application topically 3 (three) times a day for 5 days, Starting 2/27/2017, Until Sat 3/4/17, Print         STOP taking these medications       predniSONE 10 mg tablet Comments:   Reason for Stopping:         predniSONE 10 mg tablet Comments:   Reason for Stopping:         predniSONE 10 mg tablet Comments:   Reason for Stopping:         predniSONE 10 mg tablet Comments:   Reason for Stopping:         predniSONE 10 mg tablet Comments:   Reason for Stopping:               No discharge procedures on file      PDMP Review       Value Time User    PDMP Reviewed  Yes 2/26/2022  2:52 PM Mario Menendez PA-C          ED Provider  Electronically Signed by           Mario Menendez PA-C  02/26/22 1212

## 2022-02-26 NOTE — DISCHARGE INSTRUCTIONS
Keep area clean and dry  Wash daily with warm water and soap  Daily dressing changes  Take antibiotic as directed for the full duration  Can also apply topical antibiotic ointment  Continue medications as needed for pain  Follow up with PCP in 2-3 days for wound recheck or return to ER as needed    I would recommend outpatient follow up with hand specialist

## 2022-03-01 ENCOUNTER — PATIENT OUTREACH (OUTPATIENT)
Dept: CASE MANAGEMENT | Facility: HOSPITAL | Age: 52
End: 2022-03-01

## 2022-03-01 NOTE — PROGRESS NOTES
Outpatient Care Management Note:  Patient was identified via report as having a recent non urgent and non life threatening ED visit at [address] [place] [address]  Chart reviewed  Patient was in the ED on 02/26/22 for evaluation of laceration of left thumb  Per ED Provider's note: "Pt with laceration to the pad of left thumb secondary to a saw injury  Pt refused any specific repair that involved a needle  He did not cooperate for a digital block  Given pt refusing anything involving needles, not much of a repair could be completed  Will allow to heal by secondary intent  Risks/benefits/side effects/alternatives discussed  Xray does not show any bony injury  Tetanus is reported to be UTD  I do not have records of this but pt states it has been within the past 1-2 years  Sterile dressing applied and pt sent home with additional supplies for dressing changes"  Per chart review, patient may have no medical insurance and / or no PCP  No follow up appointment seems to have been made with Ortho  Telephone outreach attempt #1 made to introduce self and role of care management, follow up on general health, outreach for any possible medical or psychosocial services needed and assess for COVID-19 vaccine confidence  No answer  Unable to leave voice mail message

## 2022-07-12 ENCOUNTER — OFFICE VISIT (OUTPATIENT)
Dept: URGENT CARE | Facility: CLINIC | Age: 52
End: 2022-07-12
Payer: COMMERCIAL

## 2022-07-12 VITALS
OXYGEN SATURATION: 100 % | SYSTOLIC BLOOD PRESSURE: 131 MMHG | RESPIRATION RATE: 18 BRPM | TEMPERATURE: 97.6 F | DIASTOLIC BLOOD PRESSURE: 84 MMHG | HEART RATE: 92 BPM

## 2022-07-12 DIAGNOSIS — L25.5 DERMATITIS DUE TO PLANTS, INCLUDING POISON IVY, SUMAC, AND OAK: Primary | ICD-10-CM

## 2022-07-12 PROCEDURE — 99213 OFFICE O/P EST LOW 20 MIN: CPT | Performed by: NURSE PRACTITIONER

## 2022-07-12 RX ORDER — PREDNISONE 20 MG/1
TABLET ORAL
Qty: 20 TABLET | Refills: 0 | Status: SHIPPED | OUTPATIENT
Start: 2022-07-12

## 2022-07-12 NOTE — PATIENT INSTRUCTIONS
Take the prednisone taper as ordered until completed  This handout is for poison ivy, not poison oak, but a lot of the information is still very relevant (oatmeal baths, calamine lotion, etc)  Poison Ivy   WHAT YOU NEED TO KNOW:   What is poison ivy? Poison ivy is a plant that can cause an itchy, uncomfortable rash on your skin  Poison ivy grows as a shrub or vine in woods, fields, and areas of thick [place]  It has 3 bright green leaves on each stem that turn red in rachna  What causes a poison ivy rash? A poison ivy rash can occur when the plant oil soaks into your skin  You may get a rash if you touch: Any part of the poison ivy plant: This includes the leaves, stem, vine, roots, flowers, and berries  Pets with poison ivy on their fur:  They can spread poison ivy oil to your skin and to items inside your car and house  Items with poison ivy oil on them: This includes clothing, shoes, camping or sports equipment, or outdoor tools  A person with poison ivy oil on him:  Poison ivy oil may be on their skin or clothing  What can I do if I have been exposed to poison ivy? If you think you have touched poison ivy, rinse your skin with cool water right away  Then, wash it with soap and water  Rinse your skin well  Do not use hot water because it may cause the oil to spread on your skin  You may also put rubbing alcohol or a solution of 1/2 alcohol and 1/2 water on your skin  This may help your rash to be less severe when it breaks out on your skin  What are the signs and symptoms of a poison ivy rash? A red, swollen, itchy rash that develops within hours to days of exposure to poison ivy    A rash that appears in thick patches or thin lines where the plant leaves rubbed against your skin    Blisters that may leak clear to yellow liquid, then crust over and become scaly    How is a poison ivy rash treated?    Antiseptic or drying creams or ointments:  Your healthcare provider may recommend medicine to dry out the rash and decrease the itching  These products may be available without a doctor's order  Steroids: This medicine helps decrease itching and inflammation  It can be given as a cream to apply to your skin or as a pill  Antihistamines: This medicine may help decrease itching and help you sleep  It is available without a doctor's order  How can I manage my symptoms? Keep your rash clean and dry:  Wash it with soap and water  Gently pat it dry with a clean towel  Try not to scratch or rub your rash: This can cause your skin to become infected  Use a compress on your rash:  Dip a clean washcloth in cool water  Wring it out and place it on your rash  Leave the washcloth on your skin for 15 minutes  Do this at least 3 times per day  Take a cornstarch or oatmeal bath: If your rash is too large to cover with wet washcloths, take 3 or 4 cornstarch baths daily  Mix 1 pound of cornstarch with a little water to make a paste  Add the paste to a tub full of water and mix well  You may also use colloidal oatmeal in the bath water  Use lukewarm water  Avoid hot water because it may cause your itching to increase  Can a poison ivy rash be spread by scratching or touching it? You cannot spread poison ivy by touching your rash or the liquid from your blisters  Poison ivy is spread only if you scratch your skin while it still has oil on it  You may think your rash is spreading because new rashes appear over a number of days  This happens because areas covered by thin skin break out in a rash first  Your face or forearms may develop a rash before thicker areas, such as the palms of your hands  How can I prevent a poison ivy rash? Wear skin protection:  Wear long pants, a long-sleeved shirt, and gloves  Use a skin block lotion to protect your skin from poison ivy oil  You can find this at a drugstore without a prescription  Wash clothing after possible exposure:   If you think you have been near a poison ivy plant, wash the clothes you were wearing separately from other clothes  Rinse the washing machine well after you take the clothes out  Scrub boots and shoes with warm, soapy water  Dry clean items and clothing that you cannot wash in water  Poison ivy oil is sticky and can stay on surfaces for a long time  It can cause a new rash even years later  Bathe your pet:  Use warm water and shampoo on your pet's fur  This will prevent the spread of oil to your skin, car, and home  Wear long sleeves, long pants, and gloves while washing pets or any items that may have oil on them  Reduce exposure to poison ivy:  Do not touch plants that look like poison ivy  Keep your yard free of poison ivy  While protecting your skin, remove the plant and the roots  Place them in a plastic bag and seal the bag tightly  Do not burn poison ivy plants: This can spread the oil through the air  If you breathe the oil into your lungs, you could have swelling and serious breathing problems  Oil that clings to the fire attila can land on your skin and cause a rash  When should I contact my healthcare provider? You have pus, soft yellow scabs, or tenderness on the rash  The itching gets worse or keeps you awake at night  The rash covers more than 1/4 of your skin or spreads to your eyes, mouth, or genital area  The rash is not better after 2 to 3 weeks  You have tender, swollen glands on the sides of your neck  You have swelling in your arms and legs  You have questions or concerns about your condition or care  When should I seek immediate care or call 911? You have a fever  You have redness, swelling, and tenderness around the rash  You have trouble breathing  CARE AGREEMENT:   You have the right to help plan your care  Learn about your health condition and how it may be treated   Discuss treatment options with your healthcare providers to decide what care you want to receive  You always have the right to refuse treatment  The above information is an  only  It is not intended as medical advice for individual conditions or treatments  Talk to your doctor, nurse or pharmacist before following any medical regimen to see if it is safe and effective for you  © Copyright [employer] 2022 Information is for End User's use only and may not be sold, redistributed or otherwise used for commercial purposes   All illustrations and images included in CareNotes® are the copyrighted property of A D A M , Inc  or [address] [place] [address]

## 2022-07-12 NOTE — PROGRESS NOTES
[address]        NAME: Jory Alegria is a 45 y o  male  : 1970    MRN: [id number]  DATE: 2022  TIME: 1:58 PM      Assessment and Plan     Dermatitis due to plants, including poison ivy, sumac, and oak [L25 5]  1  Dermatitis due to plants, including poison ivy, sumac, and oak  predniSONE 20 mg tablet         Patient Instructions     Patient Instructions   Take the prednisone taper as ordered until completed  This handout is for poison ivy, not poison oak, but a lot of the information is still very relevant (oatmeal baths, calamine lotion, etc)  Poison Ivy   WHAT YOU NEED TO KNOW:   What is poison ivy? Poison ivy is a plant that can cause an itchy, uncomfortable rash on your skin  Poison ivy grows as a shrub or vine in woods, fields, and areas of thick [place]  It has 3 bright green leaves on each stem that turn red in rachna  What causes a poison ivy rash? A poison ivy rash can occur when the plant oil soaks into your skin  You may get a rash if you touch:  · Any part of the poison ivy plant: This includes the leaves, stem, vine, roots, flowers, and berries  · Pets with poison ivy on their fur:  They can spread poison ivy oil to your skin and to items inside your car and house  · Items with poison ivy oil on them: This includes clothing, shoes, camping or sports equipment, or outdoor tools  · A person with poison ivy oil on him:  Poison ivy oil may be on their skin or clothing  What can I do if I have been exposed to poison ivy? If you think you have touched poison ivy, rinse your skin with cool water right away  Then, wash it with soap and water  Rinse your skin well  Do not use hot water because it may cause the oil to spread on your skin  You may also put rubbing alcohol or a solution of 1/2 alcohol and 1/2 water on your skin  This may help your rash to be less severe when it breaks out on your skin  What are the signs and symptoms of a poison ivy rash?    · A red, swollen, itchy rash that develops within hours to days of exposure to poison ivy    · A rash that appears in thick patches or thin lines where the plant leaves rubbed against your skin    · Blisters that may leak clear to yellow liquid, then crust over and become scaly    How is a poison ivy rash treated? · Antiseptic or drying creams or ointments:  Your healthcare provider may recommend medicine to dry out the rash and decrease the itching  These products may be available without a doctor's order  · Steroids: This medicine helps decrease itching and inflammation  It can be given as a cream to apply to your skin or as a pill  · Antihistamines: This medicine may help decrease itching and help you sleep  It is available without a doctor's order  How can I manage my symptoms? · Keep your rash clean and dry:  Wash it with soap and water  Gently pat it dry with a clean towel  · Try not to scratch or rub your rash: This can cause your skin to become infected  · Use a compress on your rash:  Dip a clean washcloth in cool water  Wring it out and place it on your rash  Leave the washcloth on your skin for 15 minutes  Do this at least 3 times per day  · Take a cornstarch or oatmeal bath: If your rash is too large to cover with wet washcloths, take 3 or 4 cornstarch baths daily  Mix 1 pound of cornstarch with a little water to make a paste  Add the paste to a tub full of water and mix well  You may also use colloidal oatmeal in the bath water  Use lukewarm water  Avoid hot water because it may cause your itching to increase  Can a poison ivy rash be spread by scratching or touching it? You cannot spread poison ivy by touching your rash or the liquid from your blisters  Poison ivy is spread only if you scratch your skin while it still has oil on it  You may think your rash is spreading because new rashes appear over a number of days   This happens because areas covered by thin skin break out in a rash first  Your face or forearms may develop a rash before thicker areas, such as the palms of your hands  How can I prevent a poison ivy rash? · Wear skin protection:  Wear long pants, a long-sleeved shirt, and gloves  Use a skin block lotion to protect your skin from poison ivy oil  You can find this at a drugstore without a prescription  · Wash clothing after possible exposure: If you think you have been near a poison ivy plant, wash the clothes you were wearing separately from other clothes  Rinse the washing machine well after you take the clothes out  Scrub boots and shoes with warm, soapy water  Dry clean items and clothing that you cannot wash in water  Poison ivy oil is sticky and can stay on surfaces for a long time  It can cause a new rash even years later  · Bathe your pet:  Use warm water and shampoo on your pet's fur  This will prevent the spread of oil to your skin, car, and home  Wear long sleeves, long pants, and gloves while washing pets or any items that may have oil on them  · Reduce exposure to poison ivy:  Do not touch plants that look like poison ivy  Keep your yard free of poison ivy  While protecting your skin, remove the plant and the roots  Place them in a plastic bag and seal the bag tightly  · Do not burn poison ivy plants: This can spread the oil through the air  If you breathe the oil into your lungs, you could have swelling and serious breathing problems  Oil that clings to the fire attila can land on your skin and cause a rash  When should I contact my healthcare provider? · You have pus, soft yellow scabs, or tenderness on the rash  · The itching gets worse or keeps you awake at night  · The rash covers more than 1/4 of your skin or spreads to your eyes, mouth, or genital area  · The rash is not better after 2 to 3 weeks  · You have tender, swollen glands on the sides of your neck  · You have swelling in your arms and legs      · You have questions or concerns about your condition or care  When should I seek immediate care or call 911? · You have a fever  · You have redness, swelling, and tenderness around the rash  · You have trouble breathing  CARE AGREEMENT:   You have the right to help plan your care  Learn about your health condition and how it may be treated  Discuss treatment options with your healthcare providers to decide what care you want to receive  You always have the right to refuse treatment  The above information is an  only  It is not intended as medical advice for individual conditions or treatments  Talk to your doctor, nurse or pharmacist before following any medical regimen to see if it is safe and effective for you  © Copyright [employer] 2022 Information is for End User's use only and may not be sold, redistributed or otherwise used for commercial purposes  All illustrations and images included in CareNotes® are the copyrighted property of [employer]  or Jose Caban        Follow up with PCP in 3-5 days  Proceed to  ER if symptoms worsen  Chief Complaint     Chief Complaint   Patient presents with    Rash     Rash since yesterday  History of Present Illness     Patient accidentally contacted poison oak  He developed a rash yesterday that is quickly spreading--b/l legs, b/l arms, chest, etc   He is using otc products without much relief  In the past he has done well with oral steroids to treat the poison; at times he has had both a shot and oral steroids  Review of Systems     Review of Systems   Skin: Positive for rash  All other systems reviewed and are negative          Current Medications       Current Outpatient Medications:     predniSONE 20 mg tablet, 3 tabs daily x 3 days, 2 tabs daily x 3 days, 1 tab daily x 3 days, 1/2 tab daily x 3 days, Disp: 20 tablet, Rfl: 0    Current Allergies     Allergies as of 07/12/2022    (No Known Allergies) The following portions of the patient's history were reviewed and updated as appropriate: allergies, current medications, past family history, past medical history, past social history, past surgical history and problem list      No past medical history on file  Past Surgical History:   Procedure Laterality Date    ANKLE FRACTURE SURGERY      TONSILLECTOMY         No family history on file  Medications have been verified  Objective     /84   Pulse 92   Temp 97 6 °F (36 4 °C)   Resp 18   SpO2 100%   No LMP for male patient  Physical Exam     Physical Exam  Vitals and nursing note reviewed  Constitutional:       General: He is not in acute distress  Appearance: Normal appearance  He is well-developed  He is not ill-appearing, toxic-appearing or diaphoretic  HENT:      Head: Normocephalic and atraumatic  Pulmonary:      Effort: Pulmonary effort is normal  No respiratory distress  Abdominal:      General: There is no distension  Musculoskeletal:         General: Normal range of motion  Skin:     General: Skin is warm and dry  Capillary Refill: Capillary refill takes less than 2 seconds  Findings: Rash (on b/l arms, b/l legs, chest) present  Rash is papular (oozing clear to light werner liquid, consistent with poison oak)  Neurological:      General: No focal deficit present  Mental Status: He is alert and oriented to person, place, and time  Psychiatric:         Mood and Affect: Mood normal          Behavior: Behavior normal          Thought Content:  Thought content normal          Judgment: Judgment normal

## 2023-04-03 ENCOUNTER — APPOINTMENT (EMERGENCY)
Dept: CT IMAGING | Facility: HOSPITAL | Age: 53
End: 2023-04-03

## 2023-04-03 ENCOUNTER — HOSPITAL ENCOUNTER (EMERGENCY)
Facility: HOSPITAL | Age: 53
Discharge: HOME | End: 2023-04-03
Attending: EMERGENCY MEDICINE

## 2023-04-03 VITALS
RESPIRATION RATE: 16 BRPM | SYSTOLIC BLOOD PRESSURE: 132 MMHG | OXYGEN SATURATION: 100 % | DIASTOLIC BLOOD PRESSURE: 85 MMHG | TEMPERATURE: 97.2 F | HEART RATE: 89 BPM

## 2023-04-03 DIAGNOSIS — R30.0 DYSURIA: Primary | ICD-10-CM

## 2023-04-03 LAB
ALBUMIN SERPL BCP-MCNC: 4.4 G/DL (ref 3.5–5)
ALP SERPL-CCNC: 83 U/L (ref 34–104)
ALT SERPL W P-5'-P-CCNC: 28 U/L (ref 7–52)
ANION GAP SERPL CALCULATED.3IONS-SCNC: 11 MMOL/L (ref 4–13)
AST SERPL W P-5'-P-CCNC: 33 U/L (ref 13–39)
BASOPHILS # BLD AUTO: 0.04 THOUSANDS/ÂΜL (ref 0–0.1)
BASOPHILS NFR BLD AUTO: 1 % (ref 0–1)
BILIRUB SERPL-MCNC: 0.34 MG/DL (ref 0.2–1)
BILIRUB UR QL STRIP: NEGATIVE
BUN SERPL-MCNC: 12 MG/DL (ref 5–25)
CALCIUM SERPL-MCNC: 9.6 MG/DL (ref 8.4–10.2)
CHLORIDE SERPL-SCNC: 104 MMOL/L (ref 96–108)
CLARITY UR: CLEAR
CO2 SERPL-SCNC: 22 MMOL/L (ref 21–32)
COLOR UR: NORMAL
CREAT SERPL-MCNC: 0.75 MG/DL (ref 0.6–1.3)
EOSINOPHIL # BLD AUTO: 0.09 THOUSAND/ÂΜL (ref 0–0.61)
EOSINOPHIL NFR BLD AUTO: 2 % (ref 0–6)
ERYTHROCYTE [DISTWIDTH] IN BLOOD BY AUTOMATED COUNT: 13.6 % (ref 11.6–15.1)
GFR SERPL CREATININE-BSD FRML MDRD: 104 ML/MIN/1.73SQ M
GLUCOSE SERPL-MCNC: 103 MG/DL (ref 65–140)
GLUCOSE UR STRIP-MCNC: NEGATIVE MG/DL
HCT VFR BLD AUTO: 43.1 % (ref 36.5–49.3)
HGB BLD-MCNC: 14.4 G/DL (ref 12–17)
HGB UR QL STRIP.AUTO: NEGATIVE
IMM GRANULOCYTES # BLD AUTO: 0.01 THOUSAND/UL (ref 0–0.2)
IMM GRANULOCYTES NFR BLD AUTO: 0 % (ref 0–2)
KETONES UR STRIP-MCNC: NEGATIVE MG/DL
LEUKOCYTE ESTERASE UR QL STRIP: NEGATIVE
LYMPHOCYTES # BLD AUTO: 1.36 THOUSANDS/ÂΜL (ref 0.6–4.47)
LYMPHOCYTES NFR BLD AUTO: 24 % (ref 14–44)
MCH RBC QN AUTO: 29.5 PG (ref 26.8–34.3)
MCHC RBC AUTO-ENTMCNC: 33.4 G/DL (ref 31.4–37.4)
MCV RBC AUTO: 88 FL (ref 82–98)
MONOCYTES # BLD AUTO: 0.63 THOUSAND/ÂΜL (ref 0.17–1.22)
MONOCYTES NFR BLD AUTO: 11 % (ref 4–12)
NEUTROPHILS # BLD AUTO: 3.65 THOUSANDS/ÂΜL (ref 1.85–7.62)
NEUTS SEG NFR BLD AUTO: 62 % (ref 43–75)
NITRITE UR QL STRIP: NEGATIVE
NRBC BLD AUTO-RTO: 0 /100 WBCS
PH UR STRIP.AUTO: 6 [PH]
PLATELET # BLD AUTO: 273 THOUSANDS/UL (ref 149–390)
PMV BLD AUTO: 9.7 FL (ref 8.9–12.7)
POTASSIUM SERPL-SCNC: 4.2 MMOL/L (ref 3.5–5.3)
PROT SERPL-MCNC: 7.3 G/DL (ref 6.4–8.4)
PROT UR STRIP-MCNC: NEGATIVE MG/DL
RBC # BLD AUTO: 4.88 MILLION/UL (ref 3.88–5.62)
SODIUM SERPL-SCNC: 137 MMOL/L (ref 135–147)
SP GR UR STRIP.AUTO: 1.01 (ref 1–1.03)
UROBILINOGEN UR QL STRIP.AUTO: 0.2 E.U./DL
WBC # BLD AUTO: 5.78 THOUSAND/UL (ref 4.31–10.16)

## 2023-04-03 RX ORDER — PHENAZOPYRIDINE HYDROCHLORIDE 100 MG/1
200 TABLET, FILM COATED ORAL ONCE
Status: COMPLETED | OUTPATIENT
Start: 2023-04-03 | End: 2023-04-03

## 2023-04-03 RX ORDER — PHENAZOPYRIDINE HYDROCHLORIDE 200 MG/1
200 TABLET, FILM COATED ORAL 3 TIMES DAILY
Qty: 6 TABLET | Refills: 0 | Status: SHIPPED | OUTPATIENT
Start: 2023-04-03

## 2023-04-03 RX ORDER — DOXYCYCLINE HYCLATE 100 MG/1
100 CAPSULE ORAL ONCE
Status: COMPLETED | OUTPATIENT
Start: 2023-04-03 | End: 2023-04-03

## 2023-04-03 RX ORDER — DOXYCYCLINE HYCLATE 100 MG/1
100 CAPSULE ORAL 2 TIMES DAILY
Qty: 28 CAPSULE | Refills: 0 | Status: SHIPPED | OUTPATIENT
Start: 2023-04-03 | End: 2023-04-17

## 2023-04-03 RX ADMIN — DOXYCYCLINE 100 MG: 100 CAPSULE ORAL at 15:35

## 2023-04-03 RX ADMIN — PHENAZOPYRIDINE 200 MG: 100 TABLET ORAL at 15:35

## 2023-04-03 NOTE — DISCHARGE INSTRUCTIONS
Finish entire course of antibitotics - doxycyline for 2 weeks no calcium, antiacids   Magnesnesim multivitamin for 2 hours after taking this medication  Pyriudium to numb bladder will turn urine orange  Return with fever worsening or change in abdominal pain throwing up or any new or worsening symptoms

## 2023-04-03 NOTE — ED PROVIDER NOTES
"History  Chief Complaint   Patient presents with   • Urinary Frequency     Pt c/o urinary frequency, urgency and pain while urinating  States its been going on for months  58-year-old male presents with several month history of dysuria increased urinary frequency urinary urgency and he gets a cramp in the suprapubic region after he finishes urinating  Patient denies incomplete emptying he has had no gross hematuria he denies any penile drip denies any STI concerns  He had no prior history of kidney stones he denies any flank pain sometimes he has lower back discomfort  Urinates 5-6 times per day mostly small amounts as well as approximately 5 times nocturia this is unchanged he had no difficulties with bowel movements no history of STIs he denies any fever chills cough or upper respiratory complaints no lightheadedness no nausea or vomiting past medical history is unremarkable no prior abdominal surgeries  No trauma or falls presents today secondary to increased discomfort          Prior to Admission Medications   Prescriptions Last Dose Informant Patient Reported? Taking?   predniSONE 20 mg tablet   No No   Sig: 3 tabs daily x 3 days, 2 tabs daily x 3 days, 1 tab daily x 3 days, 1/2 tab daily x 3 days      Facility-Administered Medications: None       History reviewed  No pertinent past medical history  Past Surgical History:   Procedure Laterality Date   • ANKLE FRACTURE SURGERY     • TONSILLECTOMY         History reviewed  No pertinent family history  I have reviewed and agree with the history as documented  E-Cigarette/Vaping     E-Cigarette/Vaping Substances     Social History     Tobacco Use   • Smoking status: Every Day     Packs/day: 0 50     Years: 20 00     Pack years: 10 00     Types: Cigarettes   • Smokeless tobacco: Former   Substance Use Topics   • Alcohol use: Yes     Alcohol/week: 2 0 standard drinks     Types: 2 Cans of beer per week     Comment: \"once a week\"     • Drug use: No " Review of Systems   Constitutional: Negative for activity change, appetite change, chills and fever  HENT: Negative for congestion, ear pain, rhinorrhea, sneezing and sore throat  Eyes: Negative for discharge  Respiratory: Negative for cough and shortness of breath  Cardiovascular: Negative for chest pain and leg swelling  Gastrointestinal: Positive for abdominal pain (suprapubic pain brief after urinating)  Negative for blood in stool, diarrhea, nausea and vomiting  Endocrine: Negative for polyuria  Genitourinary: Positive for dysuria, frequency and urgency  Negative for decreased urine volume, difficulty urinating, flank pain, genital sores, hematuria, penile discharge, penile pain, penile swelling, scrotal swelling and testicular pain  Musculoskeletal: Negative for back pain and myalgias  Skin: Negative for rash  Neurological: Negative for dizziness, weakness and numbness  Hematological: Negative for adenopathy  Psychiatric/Behavioral: Negative for confusion  All other systems reviewed and are negative  Physical Exam  Physical Exam  Vitals and nursing note reviewed  Constitutional:       General: He is not in acute distress  Appearance: He is well-developed  He is not ill-appearing, toxic-appearing or diaphoretic  HENT:      Head: Normocephalic and atraumatic  Right Ear: Tympanic membrane and external ear normal       Left Ear: Tympanic membrane and external ear normal       Nose: Nose normal       Mouth/Throat:      Mouth: Mucous membranes are moist       Pharynx: No oropharyngeal exudate or posterior oropharyngeal erythema  Eyes:      General: No scleral icterus  Right eye: No discharge  Left eye: No discharge  Conjunctiva/sclera: Conjunctivae normal       Pupils: Pupils are equal, round, and reactive to light  Cardiovascular:      Rate and Rhythm: Normal rate and regular rhythm  Heart sounds: Normal heart sounds     Pulmonary: Effort: Pulmonary effort is normal  No respiratory distress  Breath sounds: Normal breath sounds  No wheezing or rales  Abdominal:      General: Bowel sounds are normal  There is no distension  Palpations: Abdomen is soft  There is no mass  Tenderness: There is no abdominal tenderness  There is no right CVA tenderness, left CVA tenderness, guarding or rebound  Comments: Back: no mildline or CVA tenderness   Genitourinary:     Comments: Refused  exam/rectal exam/prostate exam  Musculoskeletal:         General: No tenderness or deformity  Normal range of motion  Cervical back: Normal range of motion and neck supple  No rigidity  Right lower leg: No edema  Left lower leg: No edema  Lymphadenopathy:      Cervical: No cervical adenopathy  Skin:     General: Skin is warm and dry  Capillary Refill: Capillary refill takes less than 2 seconds  Neurological:      Mental Status: He is alert and oriented to person, place, and time  Cranial Nerves: No cranial nerve deficit  Sensory: No sensory deficit  Motor: No weakness or abnormal muscle tone        Coordination: Coordination normal       Deep Tendon Reflexes: Reflexes normal       Comments: Gait steady   Psychiatric:      Comments: anxiuos         Vital Signs  ED Triage Vitals [04/03/23 1054]   Temperature Pulse Respirations Blood Pressure SpO2   (!) 97 2 °F (36 2 °C) 89 16 132/85 100 %      Temp Source Heart Rate Source Patient Position - Orthostatic VS BP Location FiO2 (%)   Temporal Monitor Sitting Right arm --      Pain Score       6           Vitals:    04/03/23 1054   BP: 132/85   Pulse: 89   Patient Position - Orthostatic VS: Sitting         Visual Acuity      ED Medications  Medications   doxycycline hyclate (VIBRAMYCIN) capsule 100 mg (100 mg Oral Given 4/3/23 1535)   phenazopyridine (PYRIDIUM) tablet 200 mg (200 mg Oral Given 4/3/23 1535)       Diagnostic Studies  Results Reviewed     Procedure Component Value Units Date/Time    Trichomonas vaginalis/Mycoplasma genitalium PCR [[id number]] Collected: 04/03/23 1116    Lab Status: In process Specimen: Urine, Voided Updated: 04/03/23 1312    Chlamydia/GC amplified DNA by PCR [[id number]] Collected: 04/03/23 1116    Lab Status:  In process Updated: 04/03/23 1309    Comprehensive metabolic panel [[id number]] Collected: 04/03/23 1116    Lab Status: Final result Specimen: Blood from Arm, Right Updated: 04/03/23 1152     Sodium 137 mmol/L      Potassium 4 2 mmol/L      Chloride 104 mmol/L      CO2 22 mmol/L      ANION GAP 11 mmol/L      BUN 12 mg/dL      Creatinine 0 75 mg/dL      Glucose 103 mg/dL      Calcium 9 6 mg/dL      AST 33 U/L      ALT 28 U/L      Alkaline Phosphatase 83 U/L      Total Protein 7 3 g/dL      Albumin 4 4 g/dL      Total Bilirubin 0 34 mg/dL      eGFR 104 ml/min/1 73sq m     Narrative:      National Kidney Disease Foundation guidelines for Chronic Kidney Disease (CKD):   •  Stage 1 with normal or high GFR (GFR > 90 mL/min/1 73 square meters)  •  Stage 2 Mild CKD (GFR = 60-89 mL/min/1 73 square meters)  •  Stage 3A Moderate CKD (GFR = 45-59 mL/min/1 73 square meters)  •  Stage 3B Moderate CKD (GFR = 30-44 mL/min/1 73 square meters)  •  Stage 4 Severe CKD (GFR = 15-29 mL/min/1 73 square meters)  •  Stage 5 End Stage CKD (GFR <15 mL/min/1 73 square meters)  Note: GFR calculation is accurate only with a steady state creatinine    UA w Reflex to Microscopic w Reflex to Culture [[id number]] Collected: 04/03/23 1116    Lab Status: Final result Specimen: Urine, Clean Catch Updated: 04/03/23 1125     Color, UA Light Yellow     Clarity, UA Clear     Specific Gravity, UA 1 010     pH, UA 6 0     Leukocytes, UA Negative     Nitrite, UA Negative     Protein, UA Negative mg/dl      Glucose, UA Negative mg/dl      Ketones, UA Negative mg/dl      Urobilinogen, UA 0 2 E U /dl      Bilirubin, UA Negative     Occult Blood, UA Negative    CBC and differential [[id number]] Collected: 04/03/23 1116    Lab Status: Final result Specimen: Blood from Arm, Right Updated: 04/03/23 1123     WBC 5 78 Thousand/uL      RBC 4 88 Million/uL      Hemoglobin 14 4 g/dL      Hematocrit 43 1 %      MCV 88 fL      MCH 29 5 pg      MCHC 33 4 g/dL      RDW 13 6 %      MPV 9 7 fL      Platelets 818 Thousands/uL      nRBC 0 /100 WBCs      Neutrophils Relative 62 %      Immat GRANS % 0 %      Lymphocytes Relative 24 %      Monocytes Relative 11 %      Eosinophils Relative 2 %      Basophils Relative 1 %      Neutrophils Absolute 3 65 Thousands/µL      Immature Grans Absolute 0 01 Thousand/uL      Lymphocytes Absolute 1 36 Thousands/µL      Monocytes Absolute 0 63 Thousand/µL      Eosinophils Absolute 0 09 Thousand/µL      Basophils Absolute 0 04 Thousands/µL                  CT renal stone study abdomen pelvis without contrast   Final Result by Tommy Smiley MD (04/03 1352)      1  No obstructive uropathy  Punctate density in the right kidney may represent a tiny calculus  2   Probable tiny adherent gallstone  Workstation performed: [id number]                    Procedures  Procedures         ED Course  ED Course as of 04/03/23 1841   Mon Apr 03, 2023   1249 Patient declines pain medication   1505 TC to [facility] for close outpt followup and anx recc eg treat via myoplasma protocol vs chronic prostatitis   [address] PAC recommends only doxycyline 100mg bid for 2 weeks and office follow will place ambulatory referral   1524 PIV d/ed by me catheter intact reviewed CT findings of puntate kidney stone and gallstone and stressed importance of urology followup                                             Medical Decision Making  Mdm: no reproducible local tenderness found on exam   Patient is presenting with signs and symptoms consistent with chronic prostatitis vs urethritis  Patient has 0 postvoid residual on bladder scan    We discussed possibility of a chronic STI such as mycoplasma is agreeable to testing we will add this on  We will check CT abdomen pelvis renal stone protocol in case this may represent a chronic stone  And rule out upper tract disease  Will confer with urology    Amount and/or Complexity of Data Reviewed  Labs: ordered  Radiology: ordered  Risk  Prescription drug management  Disposition  Final diagnoses:   Dysuria     Time reflects when diagnosis was documented in both MDM as applicable and the Disposition within this note     Time User Action Codes Description Comment    4/3/2023  3:06 PM Shravanladarius Gusman Add [R30 0] Dysuria       ED Disposition     ED Disposition   Discharge    Condition   Stable    Date/Time   Mon Apr 3, 2023  3:06 PM    Comment   Fortino Radha discharge to home/self care                 Follow-up Information     Follow up With Specialties Details Why Contact Info Additional [phone] [facility] [place] [facility] Call in 1 day follow up with urology recheck of symptoms [address] [phone]  [phone]  [facility] [place] [facility], [address] [place] [address], [address] [place] [address] Av          Discharge Medication List as of 4/3/2023  3:25 PM      START taking these medications    Details   doxycycline hyclate (VIBRAMYCIN) 100 mg capsule Take 1 capsule (100 mg total) by mouth 2 (two) times a day for 14 days, Starting Mon 4/3/2023, Until Mon 4/17/2023, Normal      phenazopyridine (PYRIDIUM) 200 mg tablet Take 1 tablet (200 mg total) by mouth 3 (three) times a day, Starting Mon 4/3/2023, Normal         CONTINUE these medications which have NOT CHANGED    Details   predniSONE 20 mg tablet 3 tabs daily x 3 days, 2 tabs daily x 3 days, 1 tab daily x 3 days, 1/2 tab daily x 3 days, Normal                 PDMP Review       Value Time User    PDMP Reviewed  Yes 2/26/2022  2:52 PM Brooklyn Huerta PA-C          ED Provider  Electronically Signed by Pinky Hairston MD  04/03/23 9799

## 2023-04-04 LAB
C TRACH DNA SPEC QL NAA+PROBE: NEGATIVE
M GENITALIUM DNA SPEC QL NAA+PROBE: NEGATIVE
N GONORRHOEA DNA SPEC QL NAA+PROBE: NEGATIVE
T VAGINALIS DNA SPEC QL NAA+PROBE: NEGATIVE

## 2023-05-12 ENCOUNTER — HOSPITAL ENCOUNTER (EMERGENCY)
Facility: HOSPITAL | Age: 53
Discharge: HOME | End: 2023-05-12
Attending: EMERGENCY MEDICINE

## 2023-05-12 ENCOUNTER — APPOINTMENT (EMERGENCY)
Dept: RADIOLOGY | Facility: HOSPITAL | Age: 53
End: 2023-05-12

## 2023-05-12 VITALS
DIASTOLIC BLOOD PRESSURE: 90 MMHG | OXYGEN SATURATION: 98 % | HEART RATE: 78 BPM | TEMPERATURE: 97.4 F | RESPIRATION RATE: 18 BRPM | SYSTOLIC BLOOD PRESSURE: 154 MMHG

## 2023-05-12 DIAGNOSIS — M62.838 TRAPEZIUS MUSCLE SPASM: ICD-10-CM

## 2023-05-12 DIAGNOSIS — S16.1XXA ACUTE STRAIN OF NECK MUSCLE: Primary | ICD-10-CM

## 2023-05-12 RX ORDER — LIDOCAINE 50 MG/G
1 PATCH TOPICAL ONCE
Status: DISCONTINUED | OUTPATIENT
Start: 2023-05-12 | End: 2023-05-12 | Stop reason: HOSPADM

## 2023-05-12 RX ORDER — KETOROLAC TROMETHAMINE 30 MG/ML
30 INJECTION, SOLUTION INTRAMUSCULAR; INTRAVENOUS ONCE
Status: COMPLETED | OUTPATIENT
Start: 2023-05-12 | End: 2023-05-12

## 2023-05-12 RX ORDER — NAPROXEN 500 MG/1
500 TABLET ORAL 2 TIMES DAILY WITH MEALS
Qty: 14 TABLET | Refills: 0 | Status: SHIPPED | OUTPATIENT
Start: 2023-05-12 | End: 2023-05-19

## 2023-05-12 RX ORDER — METHOCARBAMOL 500 MG/1
500 TABLET, FILM COATED ORAL 2 TIMES DAILY
Qty: 14 TABLET | Refills: 0 | Status: SHIPPED | OUTPATIENT
Start: 2023-05-12 | End: 2023-05-19

## 2023-05-12 RX ORDER — OXYCODONE HYDROCHLORIDE 5 MG/1
5 TABLET ORAL ONCE
Status: COMPLETED | OUTPATIENT
Start: 2023-05-12 | End: 2023-05-12

## 2023-05-12 RX ORDER — ACETAMINOPHEN 325 MG/1
975 TABLET ORAL ONCE
Status: COMPLETED | OUTPATIENT
Start: 2023-05-12 | End: 2023-05-12

## 2023-05-12 RX ADMIN — OXYCODONE HYDROCHLORIDE 5 MG: 5 TABLET ORAL at 10:58

## 2023-05-12 RX ADMIN — LIDOCAINE 5% 1 PATCH: 700 PATCH TOPICAL at 10:58

## 2023-05-12 RX ADMIN — KETOROLAC TROMETHAMINE 30 MG: 30 INJECTION, SOLUTION INTRAMUSCULAR; INTRAVENOUS at 10:58

## 2023-05-12 RX ADMIN — ACETAMINOPHEN 975 MG: 325 TABLET ORAL at 10:58

## 2023-05-12 NOTE — ED PROVIDER NOTES
Final Diagnosis:  1  Acute strain of neck muscle    2  Trapezius muscle spasm        Chief Complaint   Patient presents with   • Neck Pain     Neck and left shoulder pain for one week since moving furniture     HPI  Patient presents for evaluation of left-sided neck pain  Patient states that about a week ago he was helping someone move items and then he moved and had a sudden onset of pain over the left side of his neck, posterior aspect  This is worse with movement and better at rest   Patient denies any tingling in his arm  No change in muscle weakness  No changes in vision  No nausea or vomiting  He has not been using nothing for pain  Unless otherwise specified:  - No language barrier    - History obtained from patient  - There are no limitations to the history obtained  - Previous charting was reviewed    PMH:   has no past medical history on file  PSH:   has a past surgical history that includes Tonsillectomy and Ankle fracture surgery  ROS:  Review of Systems   - 13 point ROS was performed and all are normal unless stated in the history above  - Nursing note reviewed  Vitals reviewed  - Orders placed by myself and/or advanced practitioner / resident  PE:   Vitals:    05/12/23 1033   BP: 154/90   BP Location: Right arm   Pulse: 78   Resp: 18   Temp: (!) 97 4 °F (36 3 °C)   TempSrc: Temporal   SpO2: 98%     Vitals reviewed by me  No midline C-spine tenderness  Patient has tenderness over the sternocleidomastoid as well as slightly in the occipital region on that side  No tenderness in shoulder  No step-offs or deformities  Unless otherwise specified above:    General: VS reviewed  Appears in NAD    Head: Normocephalic, atraumatic  Eyes: EOM-I      ENT: Atraumatic external nose and ears  No drooling  Neck: No JVD      CV: No pallor noted  Lungs:   No tachypnea  No respiratory distress    Abdomen:  Soft, non-tender, non-distended    MSK:   No obvious deformity    Skin: Dry, intact  No obvious rash  Psychiatric/Behavioral: Appropriate mood and affect   Exam: deferred    Physical Exam     Procedures   A:  - Nursing note reviewed  XR spine cervical 2 or 3 vw injury   ED Interpretation   No obvious fracture, trauma my interpretation        Orders Placed This Encounter   Procedures   • XR spine cervical 2 or 3 vw injury     Labs Reviewed - No data to display      Final Diagnosis:  1  Acute strain of neck muscle    2  Trapezius muscle spasm        P:  -Patient presents for evaluation of likely muscle strain given history  Discussed possible imaging  They would like to proceed  We will get x-rays to rule out acute pathology such as fracture  Patient has no symptoms to indicate nerve entrapment     Provide analgesia  -X-ray without acute pathology on my interpretation  Will provide with symptomatic management  Return precautions reviewed  Unless otherwise noted the patient's medications were reviewed and they should continue as directed  Medications   lidocaine (LIDODERM) 5 % patch 1 patch (1 patch Topical Medication Applied 5/12/23 1058)   ketorolac (TORADOL) injection 30 mg (30 mg Intramuscular Given 5/12/23 1058)   acetaminophen (TYLENOL) tablet 975 mg (975 mg Oral Given 5/12/23 1058)   oxyCODONE (ROXICODONE) IR tablet 5 mg (5 mg Oral Given 5/12/23 1058)     Time reflects when diagnosis was documented in both MDM as applicable and the Disposition within this note     Time User Action Codes Description Comment    5/12/2023 11:26 AM Mini [place] Add [M62 838] Trapezius muscle spasm     5/12/2023 11:26 AM Venkatesh Roe Add Vijay Jews  1XXA] Acute strain of neck muscle     5/12/2023 11:26 AM Mini [place] Modify [N27 354] Trapezius muscle spasm     5/12/2023 11:26 AM Venkatesh Roe Modify [S16  1XXA] Acute strain of neck muscle       ED Disposition     ED Disposition   Discharge    Condition   Stable    Date/Time   Fri May 12, "2023 11:26 AM    Comment   Carmel Grady discharge to home/self care  Follow-up Information     Follow up With Specialties Details Why DO Ronny [address]  [address]  [facility] [address]  [phone]          Patient's Medications   Discharge Prescriptions    DICLOFENAC SODIUM (VOLTAREN) 1 %    Apply 2 g topically 4 (four) times a day       Start Date: 5/12/2023 End Date: --       Order Dose: 2 g       Quantity: 50 g    Refills: 0    METHOCARBAMOL (ROBAXIN) 500 MG TABLET    Take 1 tablet (500 mg total) by mouth 2 (two) times a day for 7 days       Start Date: 5/12/2023 End Date: 5/19/2023       Order Dose: 500 mg       Quantity: 14 tablet    Refills: 0    NAPROXEN (NAPROSYN) 500 MG TABLET    Take 1 tablet (500 mg total) by mouth 2 (two) times a day with meals for 7 days       Start Date: 5/12/2023 End Date: 5/19/2023       Order Dose: 500 mg       Quantity: 14 tablet    Refills: 0     No discharge procedures on file  Prior to Admission Medications   Prescriptions Last Dose Informant Patient Reported? Taking? phenazopyridine (PYRIDIUM) 200 mg tablet   No No   Sig: Take 1 tablet (200 mg total) by mouth 3 (three) times a day   predniSONE 20 mg tablet   No No   Sig: 3 tabs daily x 3 days, 2 tabs daily x 3 days, 1 tab daily x 3 days, 1/2 tab daily x 3 days      Facility-Administered Medications: None       Portions of the record may have been created with voice recognition software  Occasional wrong word or \"sound a like\" substitutions may have occurred due to the inherent limitations of voice recognition software  Read the chart carefully and recognize, using context, where substitutions have occurred      Electronically signed by:  MD Roger Simmons MD  05/12/23 243 Jos Blanton MD  05/12/23 1130    "

## 2023-07-10 ENCOUNTER — APPOINTMENT (EMERGENCY)
Dept: CT IMAGING | Facility: HOSPITAL | Age: 53
End: 2023-07-10
Payer: COMMERCIAL

## 2023-07-10 ENCOUNTER — APPOINTMENT (EMERGENCY)
Dept: RADIOLOGY | Facility: HOSPITAL | Age: 53
End: 2023-07-10
Payer: COMMERCIAL

## 2023-07-10 ENCOUNTER — HOSPITAL ENCOUNTER (EMERGENCY)
Facility: HOSPITAL | Age: 53
Discharge: HOME | End: 2023-07-10
Attending: EMERGENCY MEDICINE | Admitting: EMERGENCY MEDICINE
Payer: COMMERCIAL

## 2023-07-10 VITALS
RESPIRATION RATE: 18 BRPM | DIASTOLIC BLOOD PRESSURE: 95 MMHG | WEIGHT: 160 LBS | OXYGEN SATURATION: 100 % | BODY MASS INDEX: 25.71 KG/M2 | TEMPERATURE: 97.8 F | SYSTOLIC BLOOD PRESSURE: 147 MMHG | HEIGHT: 66 IN | HEART RATE: 62 BPM

## 2023-07-10 DIAGNOSIS — A41.9 SEPSIS (HCC): Primary | ICD-10-CM

## 2023-07-10 LAB
ALBUMIN SERPL BCP-MCNC: 3.8 G/DL (ref 3.5–5)
ALP SERPL-CCNC: 72 U/L (ref 34–104)
ALT SERPL W P-5'-P-CCNC: 39 U/L (ref 7–52)
ANION GAP SERPL CALCULATED.3IONS-SCNC: 7 MMOL/L
APTT PPP: 28 SECONDS (ref 23–37)
AST SERPL W P-5'-P-CCNC: 30 U/L (ref 13–39)
BACTERIA UR QL AUTO: NORMAL /HPF
BASOPHILS # BLD AUTO: 0.05 THOUSANDS/ÂΜL (ref 0–0.1)
BASOPHILS NFR BLD AUTO: 0 % (ref 0–1)
BILIRUB SERPL-MCNC: 0.28 MG/DL (ref 0.2–1)
BILIRUB UR QL STRIP: NEGATIVE
BUN SERPL-MCNC: 19 MG/DL (ref 5–25)
CALCIUM SERPL-MCNC: 9.6 MG/DL (ref 8.4–10.2)
CHLORIDE SERPL-SCNC: 106 MMOL/L (ref 96–108)
CLARITY UR: CLEAR
CO2 SERPL-SCNC: 25 MMOL/L (ref 21–32)
COLOR UR: YELLOW
CREAT SERPL-MCNC: 0.89 MG/DL (ref 0.6–1.3)
D DIMER PPP FEU-MCNC: 0.92 UG/ML FEU
EOSINOPHIL # BLD AUTO: 0.22 THOUSAND/ÂΜL (ref 0–0.61)
EOSINOPHIL NFR BLD AUTO: 2 % (ref 0–6)
ERYTHROCYTE [DISTWIDTH] IN BLOOD BY AUTOMATED COUNT: 13.9 % (ref 11.6–15.1)
GFR SERPL CREATININE-BSD FRML MDRD: 97 ML/MIN/1.73SQ M
GLUCOSE SERPL-MCNC: 124 MG/DL (ref 65–140)
GLUCOSE SERPL-MCNC: 161 MG/DL (ref 65–140)
GLUCOSE UR STRIP-MCNC: NEGATIVE MG/DL
HCT VFR BLD AUTO: 38.7 % (ref 36.5–49.3)
HGB BLD-MCNC: 13.2 G/DL (ref 12–17)
HGB UR QL STRIP.AUTO: ABNORMAL
IMM GRANULOCYTES # BLD AUTO: 0.07 THOUSAND/UL (ref 0–0.2)
IMM GRANULOCYTES NFR BLD AUTO: 1 % (ref 0–2)
INR PPP: 0.97 (ref 0.84–1.19)
KETONES UR STRIP-MCNC: NEGATIVE MG/DL
LACTATE SERPL-SCNC: 2.4 MMOL/L (ref 0.5–2)
LEUKOCYTE ESTERASE UR QL STRIP: NEGATIVE
LYMPHOCYTES # BLD AUTO: 1.88 THOUSANDS/ÂΜL (ref 0.6–4.47)
LYMPHOCYTES NFR BLD AUTO: 13 % (ref 14–44)
MCH RBC QN AUTO: 30 PG (ref 26.8–34.3)
MCHC RBC AUTO-ENTMCNC: 34.1 G/DL (ref 31.4–37.4)
MCV RBC AUTO: 88 FL (ref 82–98)
MONOCYTES # BLD AUTO: 1.37 THOUSAND/ÂΜL (ref 0.17–1.22)
MONOCYTES NFR BLD AUTO: 10 % (ref 4–12)
NEUTROPHILS # BLD AUTO: 10.74 THOUSANDS/ÂΜL (ref 1.85–7.62)
NEUTS SEG NFR BLD AUTO: 74 % (ref 43–75)
NITRITE UR QL STRIP: NEGATIVE
NON-SQ EPI CELLS URNS QL MICRO: NORMAL /HPF
NRBC BLD AUTO-RTO: 0 /100 WBCS
PH UR STRIP.AUTO: 6.5 [PH]
PLATELET # BLD AUTO: 221 THOUSANDS/UL (ref 149–390)
PMV BLD AUTO: 10 FL (ref 8.9–12.7)
POTASSIUM SERPL-SCNC: 3.6 MMOL/L (ref 3.5–5.3)
PROCALCITONIN SERPL-MCNC: 9.69 NG/ML
PROT SERPL-MCNC: 6.6 G/DL (ref 6.4–8.4)
PROT UR STRIP-MCNC: NEGATIVE MG/DL
PROTHROMBIN TIME: 13.1 SECONDS (ref 11.6–14.5)
RBC # BLD AUTO: 4.4 MILLION/UL (ref 3.88–5.62)
RBC #/AREA URNS AUTO: NORMAL /HPF
SODIUM SERPL-SCNC: 138 MMOL/L (ref 135–147)
SP GR UR STRIP.AUTO: 1.01 (ref 1–1.03)
UROBILINOGEN UR QL STRIP.AUTO: 0.2 E.U./DL
WBC # BLD AUTO: 14.33 THOUSAND/UL (ref 4.31–10.16)
WBC #/AREA URNS AUTO: NORMAL /HPF

## 2023-07-10 PROCEDURE — G1004 CDSM NDSC: HCPCS

## 2023-07-10 PROCEDURE — 85610 PROTHROMBIN TIME: CPT

## 2023-07-10 PROCEDURE — 93005 ELECTROCARDIOGRAM TRACING: CPT

## 2023-07-10 PROCEDURE — 87040 BLOOD CULTURE FOR BACTERIA: CPT

## 2023-07-10 PROCEDURE — 82948 REAGENT STRIP/BLOOD GLUCOSE: CPT

## 2023-07-10 PROCEDURE — 84145 PROCALCITONIN (PCT): CPT

## 2023-07-10 PROCEDURE — 85730 THROMBOPLASTIN TIME PARTIAL: CPT

## 2023-07-10 PROCEDURE — 96365 THER/PROPH/DIAG IV INF INIT: CPT

## 2023-07-10 PROCEDURE — 96375 TX/PRO/DX INJ NEW DRUG ADDON: CPT

## 2023-07-10 PROCEDURE — 85379 FIBRIN DEGRADATION QUANT: CPT

## 2023-07-10 PROCEDURE — 80053 COMPREHEN METABOLIC PANEL: CPT

## 2023-07-10 PROCEDURE — 71046 X-RAY EXAM CHEST 2 VIEWS: CPT

## 2023-07-10 PROCEDURE — 36415 COLL VENOUS BLD VENIPUNCTURE: CPT

## 2023-07-10 PROCEDURE — 74177 CT ABD & PELVIS W/CONTRAST: CPT

## 2023-07-10 PROCEDURE — 99284 EMERGENCY DEPT VISIT MOD MDM: CPT

## 2023-07-10 PROCEDURE — 81001 URINALYSIS AUTO W/SCOPE: CPT

## 2023-07-10 PROCEDURE — 71275 CT ANGIOGRAPHY CHEST: CPT

## 2023-07-10 PROCEDURE — 83605 ASSAY OF LACTIC ACID: CPT

## 2023-07-10 PROCEDURE — 96361 HYDRATE IV INFUSION ADD-ON: CPT

## 2023-07-10 PROCEDURE — 85025 COMPLETE CBC W/AUTO DIFF WBC: CPT

## 2023-07-10 RX ORDER — CEFTRIAXONE 2 G/50ML
2000 INJECTION, SOLUTION INTRAVENOUS ONCE
Status: COMPLETED | OUTPATIENT
Start: 2023-07-10 | End: 2023-07-10

## 2023-07-10 RX ADMIN — SODIUM CHLORIDE 1000 ML: 0.9 INJECTION, SOLUTION INTRAVENOUS at 13:48

## 2023-07-10 RX ADMIN — SODIUM CHLORIDE 1000 ML: 0.9 INJECTION, SOLUTION INTRAVENOUS at 14:31

## 2023-07-10 RX ADMIN — IOHEXOL 100 ML: 350 INJECTION, SOLUTION INTRAVENOUS at 15:34

## 2023-07-10 RX ADMIN — VANCOMYCIN HYDROCHLORIDE 1000 MG: 5 INJECTION, POWDER, LYOPHILIZED, FOR SOLUTION INTRAVENOUS at 15:34

## 2023-07-10 RX ADMIN — CEFTRIAXONE 2000 MG: 2 INJECTION, SOLUTION INTRAVENOUS at 14:30

## 2023-07-10 NOTE — ED NOTES
Patient ambulated to bathroom with steady gait. Has no complaints at this time.       Crystal Todd RN  07/10/23 2122

## 2023-07-10 NOTE — ED ATTENDING ATTESTATION
7/10/2023      Randall Sebastian DO, saw and evaluated the patient. I have discussed the patient with Dr Mylene Sanchez and agree with his findings, Plan of Care, and MDM as documented in his note, except where noted. All available labs and Radiology studies were reviewed. I was present for key portions of any procedure(s) performed by Dr Myleen Sanchez, and I was immediately available to provide assistance. At this point I agree with the current assessment done in the Emergency Department. I have conducted an independent evaluation of this patient. The history and physical is as follows:    76-year-old man presenting to the emergency department with fatigue and multiple episodes of hypotension since Friday, 7 July 2023 with BPs recorded in the 80s/50s accompanied by a lightheaded/presyncopal sensation. This has been accompanied additionally by generalized myalgias/arthralgias and fatigue. He also states that he was febrile with Tmax 101 °F at home. He was otherwise in normal state of health until symptom onset. No identifiable sick contacts prior to onset of symptoms. Denies headache/chest pain/dyspnea/cough/nausea/vomiting/abdominal pain/rash/skin changes. No antibiotic use in past 30 days. Does use IV drugs, having injected methamphetamines recently 2 days prior. ROS as per HPI; otherwise negative. General: Awake/alert/no distress. Vital signs and nursing notes reviewed    HEENT:  Normocephalic/atraumatic  External ear/hearing wnl bilaterally. Neck:  Phonation normal with no stridor/dysphonia. Normal active ROM. No palpable masses or thyromegaly. No overlying skin changes. Cardiac:  Radial pulses 2+ bilaterally  DP/PT pulses 2+ bilaterally  RRR with s1/s2; no m/r/g. Pulmonary:   No respiratory distress. No accessory muscle use  Lungs CTA b/l with no w/c/r    Abdomen:  Flat. Nondistended. Nontender. No palpable masses. No guarding/rebound ttp. Skin:  Warm/dry. No diaphoresis.   No visible rashes or skin changes. No peripheral edema    Neuro:  GCS 15. PERRLA; EOMI. Facial expressions symmetric. Tongue/uvula midline. Shoulder shrug equal bilaterally  Strength 5/5 in UE/LE bilaterally  Intact sensation in UE/LE bilaterally. A/P: Reported fever with episodes of hypotension in patient with significant risk factors for bacteremia/endocarditis absolutely concerning for an underlying septic process with multiple potential etiologies including bacteremia/endocarditis/GI/pulmonary infections. We will check sepsis labs and obtain chest x-ray. Low threshold to obtain advanced imaging of the chest abdomen/pelvis particularly if there is evidence of endorgan dysfunction. Low threshold for hospital admission. ED Course  ED Course as of 07/10/23 1902   Mon Jul 10, 2023   1341 CBC and differential(!)  Leukocytosis. Hemoglobin/hematocrit normal.  Platelets normal.   5480 APTT   1351 Protime-INR   1403 Procalcitonin(!)  Elevated consistent with infectious process; will provide empiric antibiotic therapy while work-up is ongoing. Will include ceftriaxone and vancomycin. Vancomycin given due to need for empiric MRSA coverage in setting of IV drug use. Additional blood cultures were ordered given concern for endocarditis, but unfortunately patient refused further attempts at blood draws after 2 attempts at additional collection of blood cultures. 1403 Lactic acid(!!)  Elevated; will administer IV fluids and recheck   1430 D-dimer, quantitative(!)  Elevated; will require advanced imaging   1540 CT completed and awaiting interpretation   1606 UA w Reflex to Microscopic w Reflex to Culture(!)  Trace blood; otherwise normal.  No markers of infection. 56 PE Study with CT abdomen & pelvis with contrast  IMPRESSION:     1. No pulmonary embolism or acute thoracic findings. 2.  Mild biliary ductal dilatation is of uncertain etiology and significance.  No calcified gallstones or findings of acute cholecystitis. Patient's normal LFTs are noted.            Critical Care Time  ECG 12 Lead Documentation Only    Date/Time: 7/10/2023 1:44 PM    Performed by: Levi Avendano DO  Authorized by: Levi Avendano DO    Indications / Diagnosis:  Sepsis  Patient location:  ED  Previous ECG:     Comparison to cardiac monitor: Yes    Interpretation:     Interpretation: normal    Rate:     ECG rate:  79    ECG rate assessment: normal    Rhythm:     Rhythm: sinus rhythm    Ectopy:     Ectopy: none    QRS:     QRS axis:  Normal    QRS intervals:  Normal  Conduction:     Conduction: normal    ST segments:     ST segments:  Normal  T waves:     T waves: normal    Comments:      Pr 134 QBS 76       ED course: Work-up revealed findings further increasing suspicion for sepsis although with no specific source identified. Of course the patient's active IV drug use dramatically increases concern for bacteremia as well as endocarditis. Patient unfortunately refused additional attempts at blood culture draws after unsuccessful attempts at venipuncture. Initial set of blood cultures was obtained. CT did not reveal any alternate etiology for his symptoms. The patient was initially agreeable to hospitalization with further investigation for the etiology of sepsis, but subsequently decided that he did not wish to stay in the hospital under any circumstances. I have discussed with him the risks of leaving prior to completion of the work-up including permanent organ damage, worsening of any undetected conditions, and death. He understood these. He should be able to follow with his primary physician although he has not seen him for some time. Order for echocardiogram was placed at discharge.   He was informed that if the echocardiogram were to show vegetations, or that if the blood cultures were to show bacteremia, he would be called back to the hospital.  I have reassured him that he can return at any point if his sx are worsening or if he is otherwise concerned. All questions were answered to his satisfaction prior to discharge. Joanie Chau insists on leaving against medical advice, despite my recommendation to remain for ongoing treatment. 1: Capacity: I have determined that the patient has capacity to make the decision to leave against medical advice based on the following:   · A. Ability to express a choice: The patient is able to express his or her choice and communicate that choice. · B. Ability to understand relevant information: The patient is able to verbalize their diagnosis, understand information about the purpose of treatment, remember the information, and show that he or she can be part of the decision-making process. · C. Ability to appreciate the significance of the information and its consequences: The patient understands the consequences of treatment refusal and the risks and benefits of accepting or refusing treatment. · D. Ability to manipulate information: The patient is able to engage in reasoning as it applies to making treatment decisions   2: Psychiatric Consultation: There is not an indication to call psychiatry consultation to determine capacity   3. Alternative Treatment: I have discussed the recommended course of treatment and available alternatives. 4. Risks: I have discussed the specific risks of that patient refusing treatment   5. Follow-up Care: I have discussed the follow-up care and advised to see Dr. Rj Quinteros immediately   6.  ED Option: I have emphasized that the patient has the option to return to the ED

## 2023-07-10 NOTE — DISCHARGE INSTRUCTIONS
Follow-up closely with your primary care physician for further evaluation and treatment. If your symptoms are worsening or not resolving then return to the ED immediately.

## 2023-07-10 NOTE — SEPSIS NOTE
Sepsis Note   Gabby Knight 47 y.o. male MRN: [id number]  Unit/Bed#: [facility] Encounter: [id number]       Initial Sepsis Screening     Row Name 07/10/23 1419                Is the patient's history suggestive of a new or worsening infection? Yes (Proceed)  -PG        Suspected source of infection endocarditis  -PG        Indicate SIRS criteria Tachycardia > 90 bpm;Leukocytosis (WBC > 56650 IJL) OR Leukopenia (WBC <4000 IJL) OR Bandemia (WBC >10% bands)  -PG        Are two or more of the above signs & symptoms of infection both present and new to the patient? Yes (Proceed)  -PG        Assess for evidence of organ dysfunction: Are any of the below criteria present within 6 hours of suspected infection and SIRS criteria that are NOT considered to be chronic conditions? Lactate > 2.0  -PG        Date of presentation of severe sepsis 07/10/23  -PG        Time of presentation of severe sepsis 1323  -PG        Sepsis Note: Click "NEXT" below (NOT "close") to generate sepsis note based on above information. YES (proceed by clicking "NEXT")  -PG              User Key  (r) = Recorded By, (t) = Taken By, (c) = Cosigned By    [address] [place] [address] Name Provider Type    PG Clifton Weiss DO Resident                Default Flowsheet Data (last 720 hours)     Sepsis Reassess     Row Name 07/10/23 1420 07/10/23 1419                Repeat Volume Status and Tissue Perfusion Assessment Performed    Date of Reassessment: 07/10/23  -PG 07/10/23  -PG       Time of Reassessment: 1363  -PG 1420  -PG       Sepsis Reassessment Note: Click "NEXT" below (NOT "close") to generate sepsis reassessment note. YES (proceed by clicking "NEXT")  -PG --       Repeat Volume Status and Tissue Perfusion Assessment Performed -- --             User Key  (r) = Recorded By, (t) = Taken By, (c) = Cosigned By    [address] [place] [address] Name Provider Type    VALERY Weiss DO Resident                Body mass index is 25.82 kg/m².   Wt Readings from Last 1 Encounters: 07/10/23 72.6 kg (160 lb)     IBW (Ideal Body Weight): 63.8 kg    Ideal body weight: 63.8 kg (140 lb 10.5 oz)  Adjusted ideal body weight: 67.3 kg (148 lb 6.3 oz)

## 2023-07-10 NOTE — ED PROVIDER NOTES
History  Chief Complaint   Patient presents with   • Dizziness     Patient reports that his blood pressure has been low all weekend. States that he has been feeling lightheaded and dizzy. 50-year-old male with history of IVDA presents today with lightheadedness, fatigue, generalized body aches. Patient stated he was noted to have hypotension approximately 3 days ago by EMS, reading around 85/50 but denied to be transported to the hospital.  Patient noted 1 episode of nausea with vomiting today, which has not returned. He also noted a fever of 101.7 for which he took Tylenol and has resolved. Upon arrival to the ED, his blood pressure improved to 126/79, he was mildly tachycardic upon examination ranging from 105-110. Patient was afebrile. Denies chest pain/shortness of breath, cough, diarrhea/constipation, abdominal pain. Prior to Admission Medications   Prescriptions Last Dose Informant Patient Reported? Taking? Diclofenac Sodium (VOLTAREN) 1 %   No No   Sig: Apply 2 g topically 4 (four) times a day   methocarbamol (ROBAXIN) 500 mg tablet   No No   Sig: Take 1 tablet (500 mg total) by mouth 2 (two) times a day for 7 days   naproxen (Naprosyn) 500 mg tablet   No No   Sig: Take 1 tablet (500 mg total) by mouth 2 (two) times a day with meals for 7 days   phenazopyridine (PYRIDIUM) 200 mg tablet   No No   Sig: Take 1 tablet (200 mg total) by mouth 3 (three) times a day   predniSONE 20 mg tablet   No No   Sig: 3 tabs daily x 3 days, 2 tabs daily x 3 days, 1 tab daily x 3 days, 1/2 tab daily x 3 days      Facility-Administered Medications: None       History reviewed. No pertinent past medical history. Past Surgical History:   Procedure Laterality Date   • ANKLE FRACTURE SURGERY     • TONSILLECTOMY         History reviewed. No pertinent family history. I have reviewed and agree with the history as documented.     E-Cigarette/Vaping     E-Cigarette/Vaping Substances     Social History     Tobacco Use   • Smoking status: Every Day     Packs/day: 0.50     Years: 20.00     Total pack years: 10.00     Types: Cigarettes   • Smokeless tobacco: Former   Substance Use Topics   • Alcohol use: Yes     Alcohol/week: 2.0 standard drinks of alcohol     Types: 2 Cans of beer per week     Comment: "once a week". • Drug use: No        Review of Systems   Constitutional: Negative for chills and fever. HENT: Negative for ear pain and sore throat. Eyes: Negative for visual disturbance. Respiratory: Negative for cough and shortness of breath. Cardiovascular: Negative for chest pain and palpitations. Gastrointestinal: Negative for abdominal pain, nausea and vomiting. Genitourinary: Negative for dysuria and hematuria. Musculoskeletal: Negative for arthralgias and back pain. Skin: Negative for color change and rash. Neurological: Negative for seizures and syncope. All other systems reviewed and are negative.       Physical Exam  ED Triage Vitals [07/10/23 1227]   Temperature Pulse Respirations Blood Pressure SpO2   97.8 °F (36.6 °C) 97 18 130/84 99 %      Temp Source Heart Rate Source Patient Position - Orthostatic VS BP Location FiO2 (%)   Temporal Monitor Sitting Right arm --      Pain Score       No Pain             Orthostatic Vital Signs  Vitals:    07/10/23 1300 07/10/23 1400 07/10/23 1430 07/10/23 1630   BP: 126/79 111/80 121/82 147/95   Pulse: 94 82 86 62   Patient Position - Orthostatic VS:           Physical Exam    ED Medications  Medications   sodium chloride 0.9 % bolus 1,000 mL (0 mL Intravenous Stopped 7/10/23 1448)   cefTRIAXone (ROCEPHIN) IVPB (premix in dextrose) 2,000 mg 50 mL (0 mg Intravenous Stopped 7/10/23 1500)   vancomycin (VANCOCIN) 1000 mg in sodium chloride 0.9% 250 mL IVPB (1,000 mg Intravenous Given 7/10/23 1534)   sodium chloride 0.9 % bolus 1,000 mL (0 mL Intravenous Stopped 7/10/23 1531)   iohexol (OMNIPAQUE) 350 MG/ML injection (SINGLE-DOSE) 100 mL (100 mL Intravenous Given 7/10/23 1534)       Diagnostic Studies  Results Reviewed     Procedure Component Value Units Date/Time    Urine Microscopic [[id number]]  (Normal) Collected: 07/10/23 1518    Lab Status: Final result Specimen: Urine, Clean Catch Updated: 07/10/23 1620     RBC, UA 2-4 /hpf      WBC, UA 1-2 /hpf      Epithelial Cells None Seen /hpf      Bacteria, UA None Seen /hpf     UA w Reflex to Microscopic w Reflex to Culture [[id number]]  (Abnormal) Collected: 07/10/23 1518    Lab Status: Final result Specimen: Urine, Clean Catch Updated: 07/10/23 1603     Color, UA Yellow     Clarity, UA Clear     Specific Gravity, UA 1.015     pH, UA 6.5     Leukocytes, UA Negative     Nitrite, UA Negative     Protein, UA Negative mg/dl      Glucose, UA Negative mg/dl      Ketones, UA Negative mg/dl      Urobilinogen, UA 0.2 E.U./dl      Bilirubin, UA Negative     Occult Blood, UA Trace-Intact    D-dimer, quantitative [[id number]]  (Abnormal) Collected: 07/10/23 1323    Lab Status: Final result Specimen: Blood from Arm, Right Updated: 07/10/23 1425     D-Dimer, Quant 0.92 ug/ml FEU     Narrative: In the evaluation for possible pulmonary embolism, in the appropriate (Well's Score of 4 or less) patient, the age adjusted d-dimer cutoff for this patient can be calculated as:    Age x 0.01 (in ug/mL) for Age-adjusted D-dimer exclusion threshold for a patient over 50 years. Blood culture #1 [[id number]]     Lab Status: No result Specimen: Blood     Blood culture #2 [[id number]]     Lab Status: No result Specimen: Blood     Lactic acid [[id number]]  (Abnormal) Collected: 07/10/23 1323    Lab Status: Final result Specimen: Blood from Arm, Right Updated: 07/10/23 1403     LACTIC ACID 2.4 mmol/L     Narrative:      Result may be elevated if tourniquet was used during collection. Lactic acid 2 Hours [[id number]]     Lab Status: No result Specimen: Blood     Blood culture #1 [[id number]] Collected: 07/10/23 1347    Lab Status:  In process Specimen: Blood from Arm, Right Updated: 07/10/23 1359    Procalcitonin [[id number]]  (Abnormal) Collected: 07/10/23 1323    Lab Status: Final result Specimen: Blood from Arm, Right Updated: 07/10/23 1359     Procalcitonin 9.69 ng/ml     Comprehensive metabolic panel [[id number]] Collected: 07/10/23 1323    Lab Status: Final result Specimen: Blood from Arm, Right Updated: 07/10/23 1352     Sodium 138 mmol/L      Potassium 3.6 mmol/L      Chloride 106 mmol/L      CO2 25 mmol/L      ANION GAP 7 mmol/L      BUN 19 mg/dL      Creatinine 0.89 mg/dL      Glucose 124 mg/dL      Calcium 9.6 mg/dL      AST 30 U/L      ALT 39 U/L      Alkaline Phosphatase 72 U/L      Total Protein 6.6 g/dL      Albumin 3.8 g/dL      Total Bilirubin 0.28 mg/dL      eGFR 97 ml/min/1.73sq m     Narrative:      [facility] guidelines for Chronic Kidney Disease (CKD):   •  Stage 1 with normal or high GFR (GFR > 90 mL/min/1.73 square meters)  •  Stage 2 Mild CKD (GFR = 60-89 mL/min/1.73 square meters)  •  Stage 3A Moderate CKD (GFR = 45-59 mL/min/1.73 square meters)  •  Stage 3B Moderate CKD (GFR = 30-44 mL/min/1.73 square meters)  •  Stage 4 Severe CKD (GFR = 15-29 mL/min/1.73 square meters)  •  Stage 5 End Stage CKD (GFR <15 mL/min/1.73 square meters)  Note: GFR calculation is accurate only with a steady state creatinine    Protime-INR [[id number]]  (Normal) Collected: 07/10/23 1323    Lab Status: Final result Specimen: Blood from Arm, Right Updated: 07/10/23 1347     Protime 13.1 seconds      INR 0.97    APTT [[id number]]  (Normal) Collected: 07/10/23 1323    Lab Status: Final result Specimen: Blood from Arm, Right Updated: 07/10/23 1347     PTT 28 seconds     CBC and differential [[id number]]  (Abnormal) Collected: 07/10/23 1323    Lab Status: Final result Specimen: Blood from Arm, Right Updated: 07/10/23 1331     WBC 14.33 Thousand/uL      RBC 4.40 Million/uL      Hemoglobin 13.2 g/dL      Hematocrit 38.7 %      MCV 88 fL      MCH 30.0 pg      MCHC 34.1 g/dL      RDW 13.9 %      MPV 10.0 fL      Platelets 574 Thousands/uL      nRBC 0 /100 WBCs      Neutrophils Relative 74 %      Immat GRANS % 1 %      Lymphocytes Relative 13 %      Monocytes Relative 10 %      Eosinophils Relative 2 %      Basophils Relative 0 %      Neutrophils Absolute 10.74 Thousands/µL      Immature Grans Absolute 0.07 Thousand/uL      Lymphocytes Absolute 1.88 Thousands/µL      Monocytes Absolute 1.37 Thousand/µL      Eosinophils Absolute 0.22 Thousand/µL      Basophils Absolute 0.05 Thousands/µL     Blood culture #2 [[id number]] Collected: 07/10/23 1323    Lab Status: In process Specimen: Blood from Arm, Right Updated: 07/10/23 1328    Fingerstick Glucose (POCT) [[id number]]  (Abnormal) Collected: 07/10/23 1311    Lab Status: Final result Updated: 07/10/23 1326     POC Glucose 161 mg/dl                  PE Study with CT abdomen & pelvis with contrast   Final Result by Catalina Nye MD (07/10 1616)      1. No pulmonary embolism or acute thoracic findings. 2.  Mild biliary ductal dilatation is of uncertain etiology and significance. No calcified gallstones or findings of acute cholecystitis. Patient's normal LFTs are noted. Workstation performed: [id number]         XR chest pa & lateral   ED Interpretation by Chely Wayne DO (07/10 1342)   No acute cardiopulmonary disease      Final Result by Sánchez Serna MD (07/10 1531)      No acute cardiopulmonary disease.                   Workstation performed: [id number]               Procedures  ECG 12 Lead Documentation Only    Date/Time: 7/10/2023 7:00 PM    Performed by: Chely Wayne DO  Authorized by: Chely Wayne DO    Indications / Diagnosis:  Hypotension  ECG reviewed by me, the ED Provider: yes    Patient location:  ED  Previous ECG:     Comparison to cardiac monitor: Yes    Interpretation:     Interpretation: normal    Rate:     ECG rate:  79    ECG rate assessment: normal    Rhythm:     Rhythm: sinus rhythm    Ectopy:     Ectopy: none    QRS:     QRS axis:  Normal    QRS intervals:  Normal  Conduction:     Conduction: normal    ST segments:     ST segments:  Normal  T waves:     T waves: normal            ED Course  ED Course as of 07/10/23 1904   Mon Jul 10, 2023   1341 CBC and differential(!)  Leukocytosis, otherwise within normal limits and reassuring. 1342 POC Glucose(!): 161   1412 LACTIC ACID(!!): 2.4  Patient is septic, suspicious for endocarditis with history and no other obvious source of infection. We will start 30 cc/kg bolus and empiric antibiotics. 1412 Procalcitonin(!): 9.69   1447 D-Dimer, Quant(!): 0.92  CT PE study ordered   1658 Upon evaluation by [facility], patient stated he would not like to be admitted to the hospital for further evaluation and treatment. It was discussed at length of risks and benefits of hospital admission versus discharge. Patient was informed that he has presentation of sepsis and that this could cause permanent disability or death. Initial Sepsis Screening     Row Name 07/10/23 1419                Is the patient's history suggestive of a new or worsening infection? Yes (Proceed)  -PG        Suspected source of infection endocarditis  -PG        Indicate SIRS criteria Tachycardia > 90 bpm;Leukocytosis (WBC > 78483 IJL) OR Leukopenia (WBC <4000 IJL) OR Bandemia (WBC >10% bands)  -PG        Are two or more of the above signs & symptoms of infection both present and new to the patient? Yes (Proceed)  -PG        Assess for evidence of organ dysfunction: Are any of the below criteria present within 6 hours of suspected infection and SIRS criteria that are NOT considered to be chronic conditions?  Lactate > 2.0  -PG        Date of presentation of severe sepsis 07/10/23  -PG        Time of presentation of severe sepsis 1323  -PG        Sepsis Note: Click "NEXT" below (NOT "close") to generate sepsis note based on above information. YES (proceed by clicking "NEXT")  -PG              User Key  (r) = Recorded By, (t) = Taken By, (c) = Cosigned By    [facility] Name Provider Type    DO Jo-Ann Enriquez              Default Flowsheet Data (last 720 hours)     Sepsis Reassess     Row Name 07/10/23 1420 07/10/23 1419                Repeat Volume Status and Tissue Perfusion Assessment Performed    Date of Reassessment: 07/10/23  -PG 07/10/23  -PG       Time of Reassessment: 0800  -PG 1420  -PG       Sepsis Reassessment Note: Click "NEXT" below (NOT "close") to generate sepsis reassessment note. YES (proceed by clicking "NEXT")  -PG --       Repeat Volume Status and Tissue Perfusion Assessment Performed -- --             User Key  (r) = Recorded By, (t) = Taken By, (c) = Cosigned By    [facility] Name Provider Type    VALERY Mendoza DO Resident              SBIRT 22yo+    Flowsheet Row Most Recent Value   Initial Alcohol Screen: US AUDIT-C     1. How often do you have a drink containing alcohol? 0 Filed at: 07/10/2023 1228   2. How many drinks containing alcohol do you have on a typical day you are drinking? 0 Filed at: 07/10/2023 1228   3a. Male UNDER 65: How often do you have five or more drinks on one occasion? 0 Filed at: 07/10/2023 1228   3b. FEMALE Any Age, or MALE 65+: How often do you have 4 or more drinks on one occassion? 0 Filed at: 07/10/2023 1228   Audit-C Score 0 Filed at: 07/10/2023 1228   AMBER: How many times in the past year have you. .. Used an illegal drug or used a prescription medication for non-medical reasons? Never Filed at: 07/10/2023 1228                Medical Decision Making  DDx including but not limited to: metabolic abnormality, dehydration, sepsis, pneumonia, UTI, PE, ACS, MI, cardiac arrhythmia, drug abuse. Upon presenting the ED due to several readings of hypotension by EMS, and the feeling of lightheaded and weakness.   Patient is admitted IVDA with last use being 1 day ago. Patient had a blood pressure reading of 85/50 by EMS 3 days ago, but refused transport to the hospital for treatment. Upon arrival to the ED he was in no acute distress and resting comfortably on the stretcher, vital signs were stable including blood pressure, aside from being slightly tacky around 105. Due to history and tachycardia, septic work-up was initiated. He was found to have a leukocytosis and elevated lactate. Sepsis was suspected with possible source of endocarditis due to IVDA. Empiric antibiotics and 30 cc/kg of actual body weight were initiated. During multiple reevaluations, patient was resting comfortably and had no complaints. Due to need for IV antibiotics and further evaluation as discussed that would like for him to be admitted. Initially he agreed. When [facility] physician came down to evaluate him, he then refused admission to the hospital.  It was discussed at length, the risks and benefits of staying versus leaving AMA. He ultimately decided to leave AMA. An outpatient echocardiogram was ordered and patient was informed on how to proceed with that. Amount and/or Complexity of Data Reviewed  Labs: ordered. Decision-making details documented in ED Course. Radiology: ordered and independent interpretation performed. Risk  Prescription drug management.             Disposition  Final diagnoses:   Sepsis ([address]) - Susspected endocarditis     Time reflects when diagnosis was documented in both MDM as applicable and the Disposition within this note     Time User Action Codes Description Comment    7/10/2023  5:10 PM Mildred Green Endocarditis     7/10/2023  5:10 PM Peter [address] [place] [address] Endocarditis     7/10/2023  5:10 PM Rupal Dougherty Add [A41.9] Sepsis ([address])     7/10/2023  5:11 PM Rupal Dougherty Modify [A41.9] Sepsis [facility]) Susspected endocarditis      ED Disposition     ED Disposition   AMA    Condition   --    Date/Time Mon Jul 10, 2023  5:03 PM    Comment   Date: 7/10/2023  Patient: Brayan Ferrari  Admitted: 7/10/2023 12:23 PM  Attending Provider: Franny Antonio DO    Brayan Ferrari or his authorized caregiver has made the decision for the patient to leave the emergency department against the advice of his atten ding physician. He or his authorized caregiver has been informed and understands the inherent risks, including death, permanent disability, heart failure. He or his authorized caregiver has decided to accept the responsibility for this decision. Yamilet l Zuzo and all necessary parties have been advised that he may return for further evaluation or treatment. His condition at time of discharge was stable.   Brayan Ferrari had current vital signs as follows:  /95   Pulse 62   Temp 97.8 °F (36.6 °C)  (Temporal)   Resp 18   Ht 5' 6" (1.676 m)   Wt 72.6 kg (160 lb)            Follow-up Information     Follow up With Specialties Details Why Contact Info    Andre Rivas DO Family Medicine Go in 3 days For ED follow-up [address] [facility] [address] Bethany Fajardo [address] [place] [address]  [phone]            Discharge Medication List as of 7/10/2023  5:12 PM      CONTINUE these medications which have NOT CHANGED    Details   Diclofenac Sodium (VOLTAREN) 1 % Apply 2 g topically 4 (four) times a day, Starting Fri 5/12/2023, Normal      methocarbamol (ROBAXIN) 500 mg tablet Take 1 tablet (500 mg total) by mouth 2 (two) times a day for 7 days, Starting Fri 5/12/2023, Until Fri 5/19/2023, Normal      naproxen (Naprosyn) 500 mg tablet Take 1 tablet (500 mg total) by mouth 2 (two) times a day with meals for 7 days, Starting Fri 5/12/2023, Until Fri 5/19/2023, Normal      phenazopyridine (PYRIDIUM) 200 mg tablet Take 1 tablet (200 mg total) by mouth 3 (three) times a day, Starting Mon 4/3/2023, Normal      predniSONE 20 mg tablet 3 tabs daily x 3 days, 2 tabs daily x 3 days, 1 tab daily x 3 days, 1/2 tab daily x 3 days, Normal           Outpatient Discharge Orders   Echo complete w/ contrast if indicated   Standing Status: Future Standing Exp. Date: 07/10/27       PDMP Review       Value Time User    PDMP Reviewed  Yes 2/26/2022  2:52 PM Michael Garcia PA-C           ED Provider  Attending physically available and evaluated Rudi Tobin. I managed the patient along with the ED Attending.     Electronically Signed by         Leatha Barroso DO  07/10/23 4709

## 2023-07-10 NOTE — ED NOTES
Attempt made to obtain second set of cultures two times. Patient refusing any more attempts.  ED provider made aware     Giacomo Edwards RN  07/10/23 1796

## 2023-07-12 LAB
ATRIAL RATE: 79 BPM
P AXIS: 69 DEGREES
PR INTERVAL: 152 MS
QRS AXIS: 51 DEGREES
QRSD INTERVAL: 76 MS
QT INTERVAL: 360 MS
QTC INTERVAL: 412 MS
T WAVE AXIS: 64 DEGREES
VENTRICULAR RATE: 79 BPM

## 2023-07-12 PROCEDURE — 93010 ELECTROCARDIOGRAM REPORT: CPT | Performed by: INTERNAL MEDICINE

## 2023-07-15 LAB
BACTERIA BLD CULT: NORMAL
BACTERIA BLD CULT: NORMAL

## 2024-09-20 NOTE — ED NOTES
2:38 PM    Reason for Call: OVERBOOK    Patient is having the following symptoms: Patient calls this afternoon stating he forgot to schedule a preop* now emergent/Patient is needing to be seen for Preop/[facility]/09/25/24/laminectomy neck/doni chayo days.    The patient is requesting an appointment for Overbook with .    Was an appointment offered for this call? No  If yes : Appointment type              Date    Preferred method for responding to this message: Telephone Call  What is your phone number ?  [phone]    If we cannot reach you directly, may we leave a detailed response at the number you provided? Yes    Can this message wait until your PCP/provider returns, if unavailable today? Provider is in    Karrie Uribe   Visitor at bedside       Richie Salinas RN  02/26/22 5114

## 2025-03-20 ENCOUNTER — HOSPITAL ENCOUNTER (INPATIENT)
Facility: HOSPITAL | Age: 55
LOS: 4 days | Discharge: HOME | DRG: 885 | End: 2025-03-25
Attending: STUDENT IN AN ORGANIZED HEALTH CARE EDUCATION/TRAINING PROGRAM | Admitting: PSYCHIATRY & NEUROLOGY

## 2025-03-20 DIAGNOSIS — F23: Primary | ICD-10-CM

## 2025-03-20 PROCEDURE — 99285 EMERGENCY DEPT VISIT HI MDM: CPT

## 2025-03-20 ASSESSMENT — LIFESTYLE VARIABLES
HOW OFTEN DO YOU HAVE A DRINK CONTAINING ALCOHOL: PATIENT DECLINED
HOW MANY STANDARD DRINKS CONTAINING ALCOHOL DO YOU HAVE ON A TYPICAL DAY: PATIENT DECLINED

## 2025-03-20 ASSESSMENT — PAIN SCALES - GENERAL: PAINLEVEL_OUTOF10: 6

## 2025-03-21 ENCOUNTER — APPOINTMENT (OUTPATIENT)
Facility: HOSPITAL | Age: 55
DRG: 885 | End: 2025-03-21

## 2025-03-21 PROBLEM — F29 PSYCHOSIS, UNSPECIFIED PSYCHOSIS TYPE (HCC): Status: ACTIVE | Noted: 2025-03-21

## 2025-03-21 LAB
AMPHET UR QL SCN: POSITIVE
ANION GAP SERPL CALC-SCNC: 6 MMOL/L (ref 2–12)
APPEARANCE UR: CLEAR
BACTERIA URNS QL MICRO: NEGATIVE /HPF
BARBITURATES UR QL SCN: NEGATIVE
BASOPHILS # BLD: 0.04 K/UL (ref 0–0.1)
BASOPHILS NFR BLD: 0.5 % (ref 0–1)
BENZODIAZ UR QL: NEGATIVE
BILIRUB UR QL: NEGATIVE
BUN SERPL-MCNC: 14 MG/DL (ref 6–20)
BUN/CREAT SERPL: 13 (ref 12–20)
CA-I BLD-MCNC: 9.3 MG/DL (ref 8.5–10.1)
CANNABINOIDS UR QL SCN: NEGATIVE
CHLORIDE SERPL-SCNC: 107 MMOL/L (ref 97–108)
CO2 SERPL-SCNC: 25 MMOL/L (ref 21–32)
COCAINE UR QL SCN: NEGATIVE
COLOR UR: ABNORMAL
CREAT SERPL-MCNC: 1.1 MG/DL (ref 0.7–1.3)
DIFFERENTIAL METHOD BLD: NORMAL
EOSINOPHIL # BLD: 0.07 K/UL (ref 0–0.4)
EOSINOPHIL NFR BLD: 0.9 % (ref 0–7)
EPITH CASTS URNS QL MICRO: ABNORMAL /LPF
ERYTHROCYTE [DISTWIDTH] IN BLOOD BY AUTOMATED COUNT: 13.7 % (ref 11.5–14.5)
ETHANOL SERPL-MCNC: <10 MG/DL (ref 0–0.08)
GLUCOSE SERPL-MCNC: 107 MG/DL (ref 65–100)
GLUCOSE UR STRIP.AUTO-MCNC: NEGATIVE MG/DL
HCT VFR BLD AUTO: 39.3 % (ref 36.6–50.3)
HGB BLD-MCNC: 13.5 G/DL (ref 12.1–17)
HGB UR QL STRIP: NEGATIVE
IMM GRANULOCYTES # BLD AUTO: 0.03 K/UL (ref 0–0.04)
IMM GRANULOCYTES NFR BLD AUTO: 0.4 % (ref 0–0.5)
KETONES UR QL STRIP.AUTO: 5 MG/DL
LEUKOCYTE ESTERASE UR QL STRIP.AUTO: NEGATIVE
LYMPHOCYTES # BLD: 1.27 K/UL (ref 0.8–3.5)
LYMPHOCYTES NFR BLD: 16.7 % (ref 12–49)
Lab: ABNORMAL
MCH RBC QN AUTO: 28.5 PG (ref 26–34)
MCHC RBC AUTO-ENTMCNC: 34.4 G/DL (ref 30–36.5)
MCV RBC AUTO: 83.1 FL (ref 80–99)
METHADONE UR QL: NEGATIVE
MONOCYTES # BLD: 0.79 K/UL (ref 0–1)
MONOCYTES NFR BLD: 10.4 % (ref 5–13)
MUCOUS THREADS URNS QL MICRO: NEGATIVE /LPF
NEUTS SEG # BLD: 5.4 K/UL (ref 1.8–8)
NEUTS SEG NFR BLD: 71.1 % (ref 32–75)
NITRITE UR QL STRIP.AUTO: NEGATIVE
NRBC # BLD: 0 K/UL (ref 0–0.01)
NRBC BLD-RTO: 0 PER 100 WBC
OPIATES UR QL: NEGATIVE
PCP UR QL: NEGATIVE
PH UR STRIP: 6 (ref 5–8)
PLATELET # BLD AUTO: 256 K/UL (ref 150–400)
PMV BLD AUTO: 9.9 FL (ref 8.9–12.9)
POTASSIUM SERPL-SCNC: 3.6 MMOL/L (ref 3.5–5.1)
PROT UR STRIP-MCNC: NEGATIVE MG/DL
RBC # BLD AUTO: 4.73 M/UL (ref 4.1–5.7)
RBC #/AREA URNS HPF: ABNORMAL /HPF (ref 0–5)
SODIUM SERPL-SCNC: 138 MMOL/L (ref 136–145)
SP GR UR REFRACTOMETRY: 1.02 (ref 1–1.03)
UROBILINOGEN UR QL STRIP.AUTO: 2 EU/DL (ref 0.1–1)
WBC # BLD AUTO: 7.6 K/UL (ref 4.1–11.1)
WBC URNS QL MICRO: ABNORMAL /HPF (ref 0–4)

## 2025-03-21 PROCEDURE — 6370000000 HC RX 637 (ALT 250 FOR IP): Performed by: STUDENT IN AN ORGANIZED HEALTH CARE EDUCATION/TRAINING PROGRAM

## 2025-03-21 PROCEDURE — 6370000000 HC RX 637 (ALT 250 FOR IP): Performed by: PSYCHIATRY & NEUROLOGY

## 2025-03-21 PROCEDURE — 82077 ASSAY SPEC XCP UR&BREATH IA: CPT

## 2025-03-21 PROCEDURE — 85025 COMPLETE CBC W/AUTO DIFF WBC: CPT

## 2025-03-21 PROCEDURE — 1240000000 HC EMOTIONAL WELLNESS R&B

## 2025-03-21 PROCEDURE — 73562 X-RAY EXAM OF KNEE 3: CPT

## 2025-03-21 PROCEDURE — 80307 DRUG TEST PRSMV CHEM ANLYZR: CPT

## 2025-03-21 PROCEDURE — 81001 URINALYSIS AUTO W/SCOPE: CPT

## 2025-03-21 PROCEDURE — 80048 BASIC METABOLIC PNL TOTAL CA: CPT

## 2025-03-21 RX ORDER — ACETAMINOPHEN 325 MG/1
650 TABLET ORAL EVERY 4 HOURS PRN
Status: DISCONTINUED | OUTPATIENT
Start: 2025-03-21 | End: 2025-03-25 | Stop reason: HOSPADM

## 2025-03-21 RX ORDER — OLANZAPINE 5 MG/1
5 TABLET ORAL 2 TIMES DAILY
Status: DISCONTINUED | OUTPATIENT
Start: 2025-03-21 | End: 2025-03-21

## 2025-03-21 RX ORDER — IBUPROFEN 600 MG/1
600 TABLET, FILM COATED ORAL
Status: COMPLETED | OUTPATIENT
Start: 2025-03-21 | End: 2025-03-21

## 2025-03-21 RX ORDER — TRAZODONE HYDROCHLORIDE 50 MG/1
50 TABLET ORAL NIGHTLY PRN
Status: DISCONTINUED | OUTPATIENT
Start: 2025-03-21 | End: 2025-03-25 | Stop reason: HOSPADM

## 2025-03-21 RX ORDER — HYDROXYZINE HYDROCHLORIDE 50 MG/1
50 TABLET, FILM COATED ORAL 3 TIMES DAILY PRN
Status: DISCONTINUED | OUTPATIENT
Start: 2025-03-21 | End: 2025-03-25 | Stop reason: HOSPADM

## 2025-03-21 RX ORDER — MAGNESIUM HYDROXIDE/ALUMINUM HYDROXICE/SIMETHICONE 120; 1200; 1200 MG/30ML; MG/30ML; MG/30ML
30 SUSPENSION ORAL EVERY 6 HOURS PRN
Status: DISCONTINUED | OUTPATIENT
Start: 2025-03-21 | End: 2025-03-25 | Stop reason: HOSPADM

## 2025-03-21 RX ORDER — OLANZAPINE 5 MG/1
10 TABLET, ORALLY DISINTEGRATING ORAL 2 TIMES DAILY
Status: DISCONTINUED | OUTPATIENT
Start: 2025-03-21 | End: 2025-03-25

## 2025-03-21 RX ADMIN — IBUPROFEN 600 MG: 600 TABLET, FILM COATED ORAL at 02:39

## 2025-03-21 RX ADMIN — OLANZAPINE 10 MG: 5 TABLET, ORALLY DISINTEGRATING ORAL at 17:03

## 2025-03-21 ASSESSMENT — PAIN - FUNCTIONAL ASSESSMENT: PAIN_FUNCTIONAL_ASSESSMENT: NONE - DENIES PAIN

## 2025-03-21 ASSESSMENT — PAIN DESCRIPTION - ORIENTATION: ORIENTATION: LEFT

## 2025-03-21 ASSESSMENT — SLEEP AND FATIGUE QUESTIONNAIRES
AVERAGE NUMBER OF SLEEP HOURS: 6
DO YOU HAVE DIFFICULTY SLEEPING: YES
SLEEP PATTERN: RESTLESSNESS;INSOMNIA
DO YOU USE A SLEEP AID: NO

## 2025-03-21 ASSESSMENT — PAIN DESCRIPTION - LOCATION: LOCATION: KNEE

## 2025-03-21 ASSESSMENT — PAIN SCALES - GENERAL: PAINLEVEL_OUTOF10: 6

## 2025-03-21 NOTE — BSMART NOTE
Comprehensive Assessment Form Part 1      Section I - Disposition    Primary Diagnosis: Paranoia  Secondary Diagnosis:     The Medical Doctor to Psychiatrist conference was notcompleted.  The Medical Doctor is in agreement with intake disposition  The plan is inpatient admission pending medical clearance.  The on-call Psychiatrist consulted was Dr. VIRK.  The admitting Psychiatrist will be Dr. CARRILLO.  The admitting Diagnosis is paranoia.  The Payor source is Medicaid.    Section II - Integrated Summary  Summary:      This writer met with pt face to face in ED 22. Pt dressed in street clothes and was observed sitting on the stretcher. Pt was not accompanied by anyone. Pt appeared disheveled. Pt presented in an anxious and frightened mood. Pt was alert and oriented to all spheres. Pt did not appear to be responding to internal stimuli. Pt presented with clear speech and paranoid thoughts. Pt denied SI/HI and AVH.    Pt states he came to the ER because he is afraid for his life. Pt states he was on the train heading from [place] going to [place] to visit family. Per pt, he met a woman on a dating julieth and he feels she hacked his phone after he sent her a picture of himself. Pt states he feels she and \"they\" are out to harm him and he is worried that they will kill him. Pt states they are tracking his whereabouts and have access to his Chime card. Pt states when he was on the train tonight, he noticed a lot of familiar faces of people he used to know. He believes they did not get on the train with him but later on. He also reports everyone feels everyone on the train was watching him and giving him angry looks. Pt states he abruptly left the train, leaving all his things, and contacting the police to bring him in.     Pt states he has not been eating or sleeping. He also reports symptoms of anxiety and racing thoughts. Pt reports he has felt paranoid in the past but not to this extent. Pt states he has been at a drug

## 2025-03-21 NOTE — ED NOTES
RN attempted to call report to 2S at this time. Unable to accept report due to shift change. Told to call back within the next 5 minutes.

## 2025-03-21 NOTE — GROUP NOTE
Group Therapy Note    Date: 3/21/2025    Group Start Time: 1330  Group End Time: 1415  Group Topic: Recreational    SSR 2 BH NON ACUTE    Leslee Bourne        Group Therapy Note    Facilitated leisure skills group to reinforce positive coping and to manage mood through music, social interaction, group activities and art task       Attendees: 6/10       Patient's Goal:  Attend group daily     Notes:  Pt was receptive to listening to music. Interacted when prompted. Declined to select a song and a leisure task to work on      Status After Intervention:  Unchanged    Participation Level: Active Listener    Participation Quality: Appropriate      Speech:  normal      Thought Process/Content: Logical      Affective Functioning: Congruent      Mood:  calm      Level of consciousness:  Alert      Response to Learning: Progressing to goal      Endings: None Reported    Modes of Intervention: Socialization and Activity      Discipline Responsible: Recreational Therapist      Signature:  DIANA Hedrick

## 2025-03-21 NOTE — ED NOTES
RN provided patient with green gown, socks and personal hygiene items at this time.    Tooth brush and toothpaste  Body wash  Deodorant  Body powder  Knit underwear  Towel and washcloths    Dayshift RN to see if patient able to take shower upstairs in morning.

## 2025-03-21 NOTE — BH NOTE
This 54 year old white male is being admitted to Behavioral Health under the professional services of  Dr. Barrera  with psychosis.Client arrived on the unit at 0820.Client is unkempt.Has a unsteady gait.Client reports that he hurt his knees getting off the train and has been limping every since.Client reports that he was on a train from [place] traveling to [place] to visit with family.Client reports that he met a women on the internet.and believes she has hacked his phone after he sent a picture to her of himself.Client reports that he believes now that she is out to kill him and he has become paranoid that someone is out to get him.Reports that he has not been eating well and has been a decrease in his sleep.Reports he has been anxious and reports racing  thoughts.He is paranoid and guarded.Refused to sign consents and questioned why he had to do them.Refused to allow RN  students to come in doing his admission process.Reports that he feels uncomfortable about signing papers now.Client reports feeling afraid that something is going to happen to him.Client will be placed on close observation for safety..

## 2025-03-21 NOTE — GROUP NOTE
Group Therapy Note    Date: 3/21/2025    Group Start Time: 1025  Group End Time: 1105  Group Topic: Education Group - Inpatient    SSR 2  NON ACUTE    Leslee Bourne        Group Therapy Note    Facilitated discussion on stress exploration focused on being able to identify daily hassles, major life changes and life circumstances that contribute to stress and identify daily uplifts, healthy coping strategies and protective factors that counteract stress       Attendees: 4/11      Notes:  Encouraged but did not attend    Discipline Responsible: Recreational Therapist      Signature:  Leslee Bourne, ARABELLAS

## 2025-03-21 NOTE — ED TRIAGE NOTES
Patient arrives with c/o anxiety, states that he was on a train on the way down to [place] from [place] to see his brother when he stepped off the train because he \"felt overwhelming anxiety\". States he left all his belongings on the train. Denies SI/HI. -AH/VH

## 2025-03-21 NOTE — BSMART NOTE
BSMART assessment completed, and suicide risk level noted to be low (due to paranoia). Primary Nurse Angelica RN and Charge Nurse N/A and Physician Danielito notified. Concerns not observed.     This writer reviewed the [place] Suicide Severity Rating Scale in nursing flowsheet and the risk level assigned is no risk.  Based on this assessment, the risk of suicide is low risk and the plan is inpatient admission pending medical clearance.

## 2025-03-21 NOTE — PLAN OF CARE
Problem: Discharge Planning  Goal: Discharge to home or other facility with appropriate resources  Outcome: Progressing     Problem: Anxiety  Goal: Will report anxiety at manageable levels  Description: INTERVENTIONS:  1. Administer medication as ordered  2. Teach and rehearse alternative coping skills  3. Provide emotional support with 1:1 interaction with staff  Outcome: Progressing     Problem: Coping  Goal: Pt/Family able to verbalize concerns and demonstrate effective coping strategies  Description: INTERVENTIONS:  1. Assist patient/family to identify coping skills, available support systems and cultural and spiritual values  2. Provide emotional support, including active listening and acknowledgement of concerns of patient and caregivers  3. Reduce environmental stimuli, as able  4. Instruct patient/family in relaxation techniques, as appropriate  5. Assess for spiritual pain/suffering and initiate Spiritual Care, Psychosocial Clinical Specialist consults as needed  Outcome: Progressing     Problem: Decision Making  Goal: Pt/Family able to effectively weigh alternatives and participate in decision making related to treatment and care  Description: INTERVENTIONS:  1. Determine when there are differences between patient's view, family's view, and healthcare provider's view of condition  2. Facilitate patient and family articulation of goals for care  3. Help patient and family identify pros/cons of alternative solutions  4. Provide information as requested by patient/family  5. Respect patient/family right to receive or not to receive information  6. Serve as a liaison between patient and family and health care team  7. Initiate Consults from Ethics, Palliative Care or initiate Family Care Conference as is appropriate  Outcome: Progressing     Problem: Behavior  Goal: Pt/Family maintain appropriate behavior and adhere to behavioral management agreement, if implemented  Description: INTERVENTIONS:  1. Assess

## 2025-03-21 NOTE — ED NOTES
Security called at this time to transport patient upstairs. Patient up at this time with his belongings, security, and tech pushing wheelchair. Patient wanded by security, patient in green gown.
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
## 2025-03-21 NOTE — BSMART NOTE
Pt has been accepted to [facility] 2S by Dr Barrera to bed 239-01. Nurse report can be called to [phone]. Pt to go up after shift change.

## 2025-03-21 NOTE — ED NOTES
Report to day shift ER RN, Shelby at this time. Patient waiting to go to 2S after report called. Remains in ER stretcher, no acute signs of distress noted.

## 2025-03-21 NOTE — ED NOTES
2S called back at this time per request and states they are still in the middle of report. RN attempting to call for dayshift RN.

## 2025-03-21 NOTE — H&P
- 100 mg/dL Final    BUN 03/21/2025 14  6 - 20 mg/dL Final    Creatinine 03/21/2025 1.10  0.70 - 1.30 mg/dL Final    BUN/Creatinine Ratio 03/21/2025 13  12 - 20   Final    Est, Glom Filt Rate 03/21/2025 79  >60 ml/min/1.73m2 Final    Calcium 03/21/2025 9.3  8.5 - 10.1 mg/dL Final    WBC 03/21/2025 7.6  4.1 - 11.1 K/uL Final    RBC 03/21/2025 4.73  4.10 - 5.70 M/uL Final    Hemoglobin 03/21/2025 13.5  12.1 - 17.0 g/dL Final    Hematocrit 03/21/2025 39.3  36.6 - 50.3 % Final    MCV 03/21/2025 83.1  80.0 - 99.0 FL Final    MCH 03/21/2025 28.5  26.0 - 34.0 PG Final    MCHC 03/21/2025 34.4  30.0 - 36.5 g/dL Final    RDW 03/21/2025 13.7  11.5 - 14.5 % Final    Platelets 03/21/2025 256  150 - 400 K/uL Final    MPV 03/21/2025 9.9  8.9 - 12.9 FL Final    Nucleated RBCs 03/21/2025 0.0  0.0  WBC Final    nRBC 03/21/2025 0.00  0.00 - 0.01 K/uL Final    Neutrophils % 03/21/2025 71.1  32.0 - 75.0 % Final    Lymphocytes % 03/21/2025 16.7  12.0 - 49.0 % Final    Monocytes % 03/21/2025 10.4  5.0 - 13.0 % Final    Eosinophils % 03/21/2025 0.9  0.0 - 7.0 % Final    Basophils % 03/21/2025 0.5  0.0 - 1.0 % Final    Immature Granulocytes % 03/21/2025 0.4  0 - 0.5 % Final    Neutrophils Absolute 03/21/2025 5.40  1.80 - 8.00 K/UL Final    Lymphocytes Absolute 03/21/2025 1.27  0.80 - 3.50 K/UL Final    Monocytes Absolute 03/21/2025 0.79  0.00 - 1.00 K/UL Final    Eosinophils Absolute 03/21/2025 0.07  0.00 - 0.40 K/UL Final    Basophils Absolute 03/21/2025 0.04  0.00 - 0.10 K/UL Final    Immature Granulocytes Absolute 03/21/2025 0.03  0.00 - 0.04 K/UL Final    Differential Type 03/21/2025 AUTOMATED    Final    Ethanol Lvl 03/21/2025 <10  <10 mg/dL Final        RADIOLOGY REPORTS:  XR KNEE LEFT (3 VIEWS)  Result Date: 3/21/2025  EXAM: XR KNEE LEFT (3 VIEWS) INDICATION: sp fall, r/o fracture. COMPARISON: None. FINDINGS: Three views of the left knee demonstrate no fracture or other acute osseous or articular abnormality. There is no 
effusion.     No acute abnormality. Electronically signed by Sherman Marcus             MEDICATIONS       ALL MEDICATIONS    Current Facility-Administered Medications   Medication Dose Route Frequency    acetaminophen (TYLENOL) tablet 650 mg  650 mg Oral Q4H PRN    hydrOXYzine HCl (ATARAX) tablet 50 mg  50 mg Oral TID PRN    traZODone (DESYREL) tablet 50 mg  50 mg Oral Nightly PRN    magnesium hydroxide (MILK OF MAGNESIA) 400 MG/5ML suspension 30 mL  30 mL Oral Daily PRN    aluminum & magnesium hydroxide-simethicone (MAALOX PLUS) 200-200-20 MG/5ML suspension 30 mL  30 mL Oral Q6H PRN    OLANZapine (ZYPREXA) tablet 5 mg  5 mg Oral BID      SCHEDULED MEDICATIONS     OLANZapine  5 mg Oral BID                 ASSESSMENT & PLAN        The patient, Nick Washington, is a 54 y.o.  male who presents at this time for treatment of the following diagnoses:  Principal Problem:    Psychosis, unspecified psychosis type (HCC)  Plan:   Begin patient on antipsychotics, continue Atarax 50 mg 3 times daily and trazodone 50 mg nightly for sleep.    Continue to adjust psychiatric and nonpsychiatric medications as deemed appropriate  Continue to watch for changes in delusions, mood, behavior  Continue risk assessment, patient on close observation for safety  Encouraged group therapy and one-on-one counseling             A coordinated, multidisplinary treatment team (includes the nurse,  and writer) round was conducted for this initial evaluation with the patient present.    Discussed risks and benefits of the proposed medication. Patient was given the opportunity to ask questions. Informed consent given to the use of the above medications.    Continue to adjust psychiatric and non-psychiatric medications as deemed appropriate & based upon diagnoses and response to treatment.     Reviewed admission labs and medical tests in the EHR     Reviewed old psychiatric and medical records available in the EHR.    Gather additional collateral

## 2025-03-21 NOTE — ED NOTES
MD consulted regarding need for  for patient at this time. Deferred due to no SI/HI/ or active hallucinations. Patient remains resting comfortably in ER stretcher, eyes closed with equal chest rise and fall. No signs of acute distress noted. Awaiting room assignment.

## 2025-03-21 NOTE — ED NOTES
Patient changed into green gown and yellow gripper socks at this time post self care. Belongings placed in patient belongings bag labeled with patient identifiers and now in locker #22.    Belongings inventoried and as followed:    Pack of cigarettes  Tobacco pouches  Scratch lottery ticket  Pair of sunglasses  Orange belt  Khaki pants  Red bandana  Grey tshirt  Black hoodie  Pair of brown boots

## 2025-03-22 PROCEDURE — 6370000000 HC RX 637 (ALT 250 FOR IP): Performed by: PSYCHIATRY & NEUROLOGY

## 2025-03-22 PROCEDURE — 1240000000 HC EMOTIONAL WELLNESS R&B

## 2025-03-22 RX ADMIN — OLANZAPINE 10 MG: 5 TABLET, ORALLY DISINTEGRATING ORAL at 17:55

## 2025-03-22 RX ADMIN — OLANZAPINE 10 MG: 5 TABLET, ORALLY DISINTEGRATING ORAL at 08:47

## 2025-03-22 ASSESSMENT — PAIN - FUNCTIONAL ASSESSMENT: PAIN_FUNCTIONAL_ASSESSMENT: NONE - DENIES PAIN

## 2025-03-22 NOTE — GROUP NOTE
Group Therapy Note    Date: 3/22/2025    Group Start Time: 1320  Group End Time: 1405  Group Topic: Recreational    SSR 2 BH NON ACUTE    Leslee Bourne        Group Therapy Note    Facilitated leisure skills group to reinforce positive coping and to manage mood through music, social interaction, group activities and art task       Attendees: 4/11       Patient's Goal:  Client will learn and demonstrate anxiety management skills    Notes:  Pt was receptive to listening to music. Interacted when prompted. Declined to select songs and leisure task to work on     Status After Intervention:  Unchanged    Participation Level: Active Listener    Participation Quality: Appropriate      Speech:  normal      Thought Process/Content: Logical      Affective Functioning: Flat      Mood:  calm      Level of consciousness:  Alert and Preoccupied      Response to Learning: Progressing to goal      Endings: None Reported    Modes of Intervention: Socialization and Activity      Discipline Responsible: Recreational Therapist      Signature:  DIANA Hedrick

## 2025-03-22 NOTE — PLAN OF CARE
Problem: Anxiety  Goal: Will report anxiety at manageable levels  Description: INTERVENTIONS:  1. Administer medication as ordered  2. Teach and rehearse alternative coping skills  3. Provide emotional support with 1:1 interaction with staff  3/21/2025 2051 by Frieda Jacobs, RN  Outcome: Progressing    Pt has been isolative to his room. Pt denies SI/HI/AVH and has not reported any concerns at this time.

## 2025-03-22 NOTE — GROUP NOTE
Group Therapy Note    Date: 3/22/2025    Group Start Time: 1020  Group End Time: 1105  Group Topic: Education Group - Inpatient    SSR 2  NON ACUTE    Leslee Bourne        Group Therapy Note    Facilitated group to discuss definition and examples of healthy vs unhealthy coping strategies. Introduced examples of unhealthy scenarios to help identify consequences from using unhealthy strategies and recognizing healthy coping strategies that would be helpful and identifying barriers that may prevent using healthy coping strategies       Attendees: 4/10      Notes:  Encouraged but did not attend    Discipline Responsible: Recreational Therapist      Signature:  DIANA Hedrick

## 2025-03-22 NOTE — BH NOTE
Pt up ad yvette on unit. Pt presents with flat affect. Pt appears paranoid but denies it and states that people are after him. Pt denies any depression and anxiety. Pt denies SI/HI. Pt denies AVH. Pt denies racing thoughts. Pt takes medication without difficulty. Pt not interacting with peers and not attending groups. Pt cooperative and pleasant with this writer. Close observations continued to ensure pt safety.
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
## 2025-03-22 NOTE — GROUP NOTE
Group Therapy Note    Date: 3/22/2025    Group Start Time: 1600  Group End Time: 1630  Group Topic: Nursing    SSR 2 [facility] ACUTE    Marian Sofia RN        Group Therapy Note    Attendees: 5 out of 10       Patient's Goal:  Pt did not attend although called/encouraged.     Notes:  Bingo    Status After Intervention:  Unchanged    Participation Level:     Participation Quality:       Speech:        Thought Process/Content:       Affective Functioning:       Mood:       Level of consciousness:        Response to Learning:       Endings:     Modes of Intervention:       Discipline Responsible:       Signature:  Marian Sofia RN

## 2025-03-22 NOTE — CARE COORDINATION
03/22/25 1219   ITP   Date of Plan 03/22/25   Date of Next Review 03/29/25   Primary Diagnosis Code Psychosis, unspecified psychosis type (HCC) F29   Barriers to Treatment Other (comment);Need for psychoeducation;Transportation  (guarded, lack of [facility], [facility])   Strengths Incorporated in Plan Verbal   Plan of Care   Long Term Goal (LTG) Stated in patient/guardian terms \"trying to get our of here without people knowing\"   Short Term Goal 1   Short Term Goal 1 Client will learn and demonstrate anxiety management skills   Baseline Functioning pt reported feeling anxious and having to get off of the bus   Target pt will learn two healthy grounding techniques and demonstrate them   Objectives Client will participate in individual therapy;Client will participate in group therapy   Intervention  Indvidual therapy   Frequency daily   Measured by Behavioral data;Self report;Staff observation   Staff Responsible [facility] staff   Intervention 2 Group therapy   Frequency daily   Measured by Behavioral data;Self report;Staff observation   Staff Responsible Clinical staff;[facility] staff   Intervention 3 Monitor medications   Frequency daily   Measured by Self report;Staff observation   Staff Responsible Clinical staff   STG Goal 1 Status: Patient Appears to be  Partially meeting treatment plan goal   Short Term Goal 2   Short Term Goal 2 Client will learn and demonstrate improved communication skills   Baseline Functioning pt is guarded and provided minimal verbal communication   Target pt will demonstrate verbal communication with writer and staff   Objectives Client will participate in individual therapy;Client will participate in group therapy   Intervention  Indvidual therapy   Frequency daily   Measured by Self report;Staff observation   Staff Responsible [facility] staff   Intervention 2 Group therapy   Frequency daily   Measured by Self report;Staff observation   Staff Responsible Clinical staff;[facility] staff   Intervention 3 Family

## 2025-03-22 NOTE — CONSULTS
Consult Date: 3/22/2025    Chief Complaint: Left knee joint  Chief Complaint   Patient presents with    Anxiety    Mental Health Problem      HPI: HPI patient is a 54-year-old white male who has been admitted here for psychiatry evaluation treatment complaints of left knee joint pain which has been holding since day before yesterday denies any history of fall or injury no history of cough fever or chills no history of chest pain shortness of breath nausea vomiting diarrhea abdominal pain or black stool.  No history of increased frequency of micturition or painful micturition no history of any other joint pain or swelling no history of headache dizziness loss of consciousness or seizures no Sipp change in appetite or weight no history of ear or nasal discharge no sore throat pain or earache  ROS:Review of Systems     Constitutional: Negative  HEENT: Negative  CVS: Negative  RS: Negative  GI: Negative  : Negative  Musculoskeletal: Left knee joint pain no swelling  Immunology: Records are not available  Neurology: Negative  Endocrine: Negative  Haem-Onc: Negative  Skin: Negative  Psychiatry: Depression  Allergies  No Known Allergies  FAMILY HISTORY:  No family history on file.  SOCIAL HISTORY:  Social History     Socioeconomic History    Marital status: Unknown     Spouse name: Not on file    Number of children: Not on file    Years of education: Not on file    Highest education level: Not on file   Occupational History    Not on file   Tobacco Use    Smoking status: Some Days     Types: Cigarettes    Smokeless tobacco: Never   Substance and Sexual Activity    Alcohol use: Not on file    Drug use: Never    Sexual activity: Not on file   Other Topics Concern    Not on file   Social History Narrative    Not on file     Social Drivers of Health     Financial Resource Strain: Not on file   Food Insecurity: Food Insecurity Present (3/21/2025)    Hunger Vital Sign     Worried About Running Out of Food in the Last Year:

## 2025-03-22 NOTE — BH NOTE
PSA PART II ADDITIONAL INFORMATION        Access To Fire Arms: No    Substance Use: yes    Last Use: pt did not remember when    Type of Substance: alcohol    Frequency of Use: \"barely\"    Request to See : No    If yes, notified : No    Guardian:No    Guardian Contact:pt is his own legal guardian     Release of Information Signed: No    Release of Information Signed For: pt denied     Goal: \"trying to get out of here without anyone knowing\"

## 2025-03-22 NOTE — GROUP NOTE
Group Therapy Note    Date: 3/22/2025    Group Start Time: 0815  Group End Time: 0845  Group Topic: Community Meeting    SSR 2 [facility] ACUTE    Marian Sofia RN        Group Therapy Note    Attendees: 5 OUT OF 10       Patient's Goal:  Pt did not attend although called/encouraged.     Notes:      Status After Intervention:      Participation Level:     Participation Quality:       Speech:        Thought Process/Content:       Affective Functioning:       Mood:       Level of consciousness:        Response to Learning:       Endings:     Modes of Intervention:       Discipline Responsible:       Signature:  Marian Sofia RN

## 2025-03-23 PROCEDURE — 6370000000 HC RX 637 (ALT 250 FOR IP): Performed by: PSYCHIATRY & NEUROLOGY

## 2025-03-23 PROCEDURE — 1240000000 HC EMOTIONAL WELLNESS R&B

## 2025-03-23 RX ADMIN — ACETAMINOPHEN 650 MG: 325 TABLET ORAL at 16:56

## 2025-03-23 RX ADMIN — OLANZAPINE 10 MG: 5 TABLET, ORALLY DISINTEGRATING ORAL at 16:36

## 2025-03-23 RX ADMIN — OLANZAPINE 10 MG: 5 TABLET, ORALLY DISINTEGRATING ORAL at 08:08

## 2025-03-23 ASSESSMENT — PAIN DESCRIPTION - ORIENTATION: ORIENTATION: LEFT

## 2025-03-23 ASSESSMENT — PAIN SCALES - GENERAL
PAINLEVEL_OUTOF10: 6
PAINLEVEL_OUTOF10: 0

## 2025-03-23 ASSESSMENT — PAIN - FUNCTIONAL ASSESSMENT
PAIN_FUNCTIONAL_ASSESSMENT: ACTIVITIES ARE NOT PREVENTED
PAIN_FUNCTIONAL_ASSESSMENT: NONE - DENIES PAIN

## 2025-03-23 ASSESSMENT — PAIN DESCRIPTION - LOCATION: LOCATION: LEG

## 2025-03-23 ASSESSMENT — PAIN DESCRIPTION - DESCRIPTORS: DESCRIPTORS: ACHING

## 2025-03-23 NOTE — PLAN OF CARE
Problem: Anxiety  Goal: Will report anxiety at manageable levels  Description: INTERVENTIONS:  1. Administer medication as ordered  2. Teach and rehearse alternative coping skills  3. Provide emotional support with 1:1 interaction with staff  3/22/2025 2033 by Frieda Jacobs RN  Outcome: Progressing    Pt has been isolative to his room. Pt declined scheduled Voltaren, \"I don't need it I'm fine.\" Pt denies SI/HI/AVH and has not reported any concerns at this time.
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
## 2025-03-23 NOTE — PLAN OF CARE
Problem: Coping  Goal: Pt/Family able to verbalize concerns and demonstrate effective coping strategies  Description: INTERVENTIONS:  1. Assist patient/family to identify coping skills, available support systems and cultural and spiritual values  2. Provide emotional support, including active listening and acknowledgement of concerns of patient and caregivers  3. Reduce environmental stimuli, as able  4. Instruct patient/family in relaxation techniques, as appropriate  5. Assess for spiritual pain/suffering and initiate Spiritual Care, Psychosocial Clinical Specialist consults as needed  Outcome: Not Progressing     Affect flat; however, pleasant during interactions. Some eye to eye contact during interactions, which are very minimal. Cont to spend the majority of x, in bed. Pt accepted his bfkt tray, and consumed 100%, in his room. Compliant with 0900 scheduled meds. Appearance disheveled; pt has not attended to his hygiene, this shift; provided with toiletries. Pt did not attend groups, today, although called/encouraged. Up to the day room, for dinner; consumed 100% and returned to bed. Observed limping/favoring Lt leg, when ambulating to day room, for dinner; cooperative  with utilizing the hand rails. Accepted 2 Tylenol tabs, for pain 6-7/10. Pt rated his depression and

## 2025-03-23 NOTE — GROUP NOTE
Group Therapy Note    Date: 3/23/2025    Group Start Time: 1110  Group End Time: 1140  Group Topic: Education Group - Inpatient    SSR 2  NON ACUTE    Leslee Bourne        Group Therapy Note    Facilitated group to focus on understanding the importance of healthy boundaries and developing healthy boundaries to help improve relationships        Attendees: 3/8      Notes:  Encouraged but did not attend    Discipline Responsible: Recreational Therapist      Signature:  DIANA Hedrick
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
## 2025-03-23 NOTE — GROUP NOTE
Group Therapy Note    Date: 3/23/2025    Group Start Time: 1555  Group End Time: 1635  Group Topic: Recreational    SSR 2 BH NON ACUTE    Leslee Bourne        Group Therapy Note    Facilitated leisure skills group to reinforce positive coping and to manage mood through music, social interaction, group activities and art task       Attendees: 4/8       Notes:  Encouraged but did not attend    Discipline Responsible: Recreational Therapist      Signature:  DIANA Hedrick

## 2025-03-24 PROCEDURE — 1240000000 HC EMOTIONAL WELLNESS R&B

## 2025-03-24 PROCEDURE — 6370000000 HC RX 637 (ALT 250 FOR IP): Performed by: PSYCHIATRY & NEUROLOGY

## 2025-03-24 RX ADMIN — OLANZAPINE 10 MG: 5 TABLET, ORALLY DISINTEGRATING ORAL at 16:38

## 2025-03-24 RX ADMIN — OLANZAPINE 10 MG: 5 TABLET, ORALLY DISINTEGRATING ORAL at 08:31

## 2025-03-24 ASSESSMENT — PAIN SCALES - GENERAL: PAINLEVEL_OUTOF10: 0

## 2025-03-24 NOTE — GROUP NOTE
Group Therapy Note    Date: 3/24/2025    Group Start Time: 1120  Group End Time: 1155  Group Topic: Education Group - Inpatient    SSR 2  NON ACUTE    Leslee Bourne        Group Therapy Note    Facilitated group to discuss “reaction patterns” to different situations and recognizing patterns in behaviors that need to be changed for future reactions       Attendees: 4/10       Notes:  Encouraged but did not attend    Discipline Responsible: Recreational Therapist      Signature:  DIANA Hedrick

## 2025-03-24 NOTE — BH NOTE
Behavioral Health Treatment Team Note     Patient goal(s) for today: \"make phone calls\"  Treatment team focus/goals: meds management, dc planning, group therapy    Progress note: Pt was seen in his room as he was resting. Pt woke up easily and presented to be open to this writer's approach, engaging in conversation with appropriate eye contact. Pt presented to be alert, oriented, calm, disheveled, somewhat guarded, with a flat affect. Pt denied current si/hi/ah/vh. Pt stated he \"slept better last night\" and was \"feeling better.\" Pt inquired about discharged and voiced understanding when informed that his treatment plan was still in progress and that his dc date was not known yet. Pt did not voice any further concerns. Pt cont to meet criteria for inpt stay for further stabilization through meds management.     LOS:  3  Expected LOS: 5-7 days    Insurance info/prescription coverage:  Patient had no active insurance coverage at the time of this contact.   Date of last family contact:  pt declined any family contact  Family requesting physician contact today:  No  Discharge plan:  to stabilize pt   Guns in the home:  No   Outpatient provider(s):  will be established prior to dc    Participating treatment team members: Nick Washington, * (assigned SW), Gaston Mauricio MS

## 2025-03-24 NOTE — GROUP NOTE
Group Therapy Note    Date: 3/24/2025    Group Start Time: 1550  Group End Time: 1630  Group Topic: Recreational    SSR 2 BH NON ACUTE    Leslee Bourne        Group Therapy Note    Facilitated leisure skills group to reinforce positive coping and to manage mood through music, social interaction, group activities and art task       Attendees: 4/10      Notes: Encouraged but did not attend    Discipline Responsible: Recreational Therapist      Signature:  DIANA Hedrick

## 2025-03-24 NOTE — BH NOTE
Pt.is up and visible on unit,affect is flat, appetite good,appearance is disheveled, denies feeling suicidal ,mood is depressed, pt.is paranoid, guarded,suspicious,insight and judgement is limited, denies racing thoughts,denies hallucinations,no other concerns voiced, remains on close observation.
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
## 2025-03-24 NOTE — PLAN OF CARE
Problem: Anxiety  Goal: Will report anxiety at manageable levels  Description: INTERVENTIONS:  1. Administer medication as ordered  2. Teach and rehearse alternative coping skills  3. Provide emotional support with 1:1 interaction with staff  Outcome: Progressing    Pt has been isolative to his room. Pt denies SI/HI/AVH and reported current mood as, \"alright.\"   Pt declined scheduled Voltaren stating, \"I don't need it\" and has not reported any concerns at this time.

## 2025-03-24 NOTE — GROUP NOTE
Group Therapy Note    Date: 3/24/2025    Group Start Time: 1330  Group End Time: 1400  Group Topic: Process Group - Inpatient    SSR 2 [facility] ACUTE    Gaston Mauricio        Group Therapy Note: This writer gave each individual a handout on \"my strengths and qualities\" and the responses were discussed in a group setting among peers.     Attendees: 3       Patient's Goal:  to attend groups.     Notes:  Pt was encouraged to attend but did not.       Signature:  Gaston Mauricio

## 2025-03-24 NOTE — GROUP NOTE
Group Therapy Note    Date: 3/24/2025    Group Start Time: 1100  Group End Time: 1115  Group Topic: Community Meeting    SSR 2 [facility]    Danisha Horton        Group Therapy Note    Attendees: 4/10    Notes:  pt encouraged to attend group but did not attend   Discipline Responsible: Behavorial Health Tech      Signature:  Danisha Horton

## 2025-03-25 VITALS
HEIGHT: 66 IN | WEIGHT: 180 LBS | BODY MASS INDEX: 28.93 KG/M2 | RESPIRATION RATE: 18 BRPM | HEART RATE: 72 BPM | DIASTOLIC BLOOD PRESSURE: 78 MMHG | TEMPERATURE: 97.9 F | SYSTOLIC BLOOD PRESSURE: 113 MMHG | OXYGEN SATURATION: 96 %

## 2025-03-25 PROCEDURE — 6370000000 HC RX 637 (ALT 250 FOR IP): Performed by: PSYCHIATRY & NEUROLOGY

## 2025-03-25 RX ORDER — OLANZAPINE 10 MG/1
10 TABLET, ORALLY DISINTEGRATING ORAL DAILY
Qty: 30 TABLET | Refills: 1 | Status: SHIPPED | OUTPATIENT
Start: 2025-03-26

## 2025-03-25 RX ORDER — OLANZAPINE 5 MG/1
10 TABLET, ORALLY DISINTEGRATING ORAL DAILY
Status: DISCONTINUED | OUTPATIENT
Start: 2025-03-26 | End: 2025-03-25 | Stop reason: HOSPADM

## 2025-03-25 RX ADMIN — OLANZAPINE 10 MG: 5 TABLET, ORALLY DISINTEGRATING ORAL at 09:18

## 2025-03-25 ASSESSMENT — PAIN DESCRIPTION - LOCATION: LOCATION: KNEE

## 2025-03-25 ASSESSMENT — PAIN DESCRIPTION - ORIENTATION: ORIENTATION: LEFT

## 2025-03-25 ASSESSMENT — PAIN SCALES - GENERAL: PAINLEVEL_OUTOF10: 0

## 2025-03-25 ASSESSMENT — PAIN DESCRIPTION - DESCRIPTORS: DESCRIPTORS: ACHING

## 2025-03-25 ASSESSMENT — PAIN - FUNCTIONAL ASSESSMENT: PAIN_FUNCTIONAL_ASSESSMENT: ACTIVITIES ARE NOT PREVENTED

## 2025-03-25 NOTE — PLAN OF CARE
Problem: Discharge Planning  Goal: Discharge to home or other facility with appropriate resources  3/25/2025 1106 by Angie Alcala RN  Outcome: Progressing  3/25/2025 0008 by Yarely Carrizales RN  Outcome: Progressing     Problem: Anxiety  Goal: Will report anxiety at manageable levels  Description: INTERVENTIONS:  1. Administer medication as ordered  2. Teach and rehearse alternative coping skills  3. Provide emotional support with 1:1 interaction with staff  3/25/2025 1106 by Angie Alcala RN  Outcome: Progressing  3/25/2025 0008 by Yarely Carrizales RN  Outcome: Progressing     Problem: Coping  Goal: Pt/Family able to verbalize concerns and demonstrate effective coping strategies  Description: INTERVENTIONS:  1. Assist patient/family to identify coping skills, available support systems and cultural and spiritual values  2. Provide emotional support, including active listening and acknowledgement of concerns of patient and caregivers  3. Reduce environmental stimuli, as able  4. Instruct patient/family in relaxation techniques, as appropriate  5. Assess for spiritual pain/suffering and initiate Spiritual Care, Psychosocial Clinical Specialist consults as needed  3/25/2025 1106 by Angie Alcala RN  Outcome: Progressing  3/25/2025 0008 by Yarely Carrizales RN  Outcome: Progressing     Problem: Decision Making  Goal: Pt/Family able to effectively weigh alternatives and participate in decision making related to treatment and care  Description: INTERVENTIONS:  1. Determine when there are differences between patient's view, family's view, and healthcare provider's view of condition  2. Facilitate patient and family articulation of goals for care  3. Help patient and family identify pros/cons of alternative solutions  4. Provide information as requested by patient/family  5. Respect patient/family right to receive or not to receive information  6. Serve as a liaison between patient and family and health care

## 2025-03-25 NOTE — GROUP NOTE
Group Therapy Note    Date: 3/25/2025    Group Start Time: 1115  Group End Time: 1145  Group Topic: Process Group - Inpatient    SSR 2 [facility] Gaston Casey        Group Therapy Note: Each individual was provided with a handout on \"anxiety\" to include it's definition, symptoms, types and treatments. Each individual discussed their own trigger, symptoms felt and positive ways to cope and then a game was played using the \"anxiety ball.\"    Attendees: 3       Patient's Goal:  to attend groups.     Notes:  Pt was in the process of getting ready for discharge and so did not attend.         Signature:  Gaston Mauricio

## 2025-03-25 NOTE — BH NOTE
DISCHARGE SUMMARY    NAME:Nick Washington  : 1970  MRN: [id number]    The patient Nick Washington exhibits the ability to control behavior in a less restrictive environment.  Patient's level of functioning is improving.  No assaultive/destructive behavior has been observed for the past 24 hours.  No suicidal/homicidal threat or behavior has been observed for the past 24 hours.  There is no evidence of serious medication side effects.  Patient has not been in physical or protective restraints for at least the past 24 hours.    If weapons involved, how are they secured? N/a    Is patient aware of and in agreement with discharge plan? yes    Arrangements for medication:  Prescriptions see chart    Copy of discharge instructions to provider?:  yes    Arrangements for transportation home:  cab    Keep all follow up appointments as scheduled, continue to take prescribed medications per physician instructions.  Mental health crisis number:  [phone]    Mental Health Emergency WARM LINE      [phone] ([phone])      M-F: 9am to 9pm      Sat & Sun: 5pm - 9pm  National suicide prevention lines:                             7-296-HPQPRQU (2-359-984-5456)       4-619-786-TALK ([phone])    Crisis Text Line:  Text HOME to 374228
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
## 2025-03-25 NOTE — BH NOTE
Patient isolated to his room for the entire evening.  He declined his evening snack.  He was pleasant on approach.  He denies any SI/HI or A/V/H, accepted his vitals to be taken.  Reports no physical complaints except some soreness in his knee.  Missing dose of Voltaren requested.  Pt remains on Q 15 minute checks for safety.

## 2025-03-25 NOTE — BH NOTE
Pt discharged via ambulatory at 1335 denying SI/HI or physical complaints. Verified receipt of personal and safe belongings. Pt verbalized understanding of discharge instructions, follow up care. Written prescription for medications provided to patient. Pt picked up by [employer] cab and taken to [facility] in [place].

## 2025-03-25 NOTE — DISCHARGE SUMMARY
PSYCHIATRIC DISCHARGE SUMMARY         IDENTIFICATION:    Patient Name  Nick Washington   Date of Birth 1970   [facility] [id number]   Medical Record Number  [id number]      Age  54 y.o.   PCP No primary care provider on file.   Admit date:  3/20/2025    Discharge date: 3/25/2025   Room Number  244/01  @ [facility]   Date of Service  3/25/2025            TYPE OF DISCHARGE: REGULAR               CONDITION AT DISCHARGE: {condition:[id number]}       PROVISIONAL & DISCHARGE DIAGNOSES:      Problem List Items Addressed This Visit    None  Visit Diagnoses         Acute psychogenic paranoid psychosis (HCC)    -  Primary              CC & HISTORY OF PRESENT ILLNESS:         SOCIAL HISTORY:    Social History     Socioeconomic History    Marital status: Unknown     Spouse name: Not on file    Number of children: Not on file    Years of education: Not on file    Highest education level: Not on file   Occupational History    Not on file   Tobacco Use    Smoking status: Some Days     Types: Cigarettes    Smokeless tobacco: Never   Substance and Sexual Activity    Alcohol use: Not on file    Drug use: Never    Sexual activity: Not on file   Other Topics Concern    Not on file   Social History Narrative    Not on file     Social Drivers of Health     Financial Resource Strain: Not on file   Food Insecurity: Food Insecurity Present (3/21/2025)    Hunger Vital Sign     Worried About Running Out of Food in the Last Year: Sometimes true     Ran Out of Food in the Last Year: Sometimes true   Transportation Needs: Unmet Transportation Needs (3/21/2025)    PRAPARE - Transportation     Lack of Transportation (Medical): Yes     Lack of Transportation (Non-Medical): Yes   Physical Activity: Not on file   Stress: Not on file   Social Connections: Not on file   Intimate Partner Violence: Not on file   Housing Stability: Low Risk  (3/21/2025)    Housing Stability Vital Sign     Unable to Pay for Housing in the Last Year: No     Number of